# Patient Record
Sex: FEMALE | Race: WHITE | Employment: OTHER | URBAN - METROPOLITAN AREA
[De-identification: names, ages, dates, MRNs, and addresses within clinical notes are randomized per-mention and may not be internally consistent; named-entity substitution may affect disease eponyms.]

---

## 2018-04-06 ENCOUNTER — RECORDS - HEALTHEAST (OUTPATIENT)
Dept: LAB | Facility: CLINIC | Age: 83
End: 2018-04-06

## 2018-04-11 LAB
QTF INTERPRETATION: NORMAL
QTF MITOGEN - NIL: >10 IU/ML
QTF NIL: 0.08 IU/ML
QTF RESULT: NEGATIVE
QTF TB ANTIGEN - NIL: -0.02 IU/ML

## 2020-01-23 ENCOUNTER — RECORDS - HEALTHEAST (OUTPATIENT)
Dept: LAB | Facility: CLINIC | Age: 85
End: 2020-01-23

## 2020-01-23 LAB
ANION GAP SERPL CALCULATED.3IONS-SCNC: 9 MMOL/L (ref 5–18)
BUN SERPL-MCNC: 20 MG/DL (ref 8–28)
CALCIUM SERPL-MCNC: 9 MG/DL (ref 8.5–10.5)
CHLORIDE BLD-SCNC: 94 MMOL/L (ref 98–107)
CO2 SERPL-SCNC: 27 MMOL/L (ref 22–31)
CREAT SERPL-MCNC: 0.77 MG/DL (ref 0.6–1.1)
ERYTHROCYTE [DISTWIDTH] IN BLOOD BY AUTOMATED COUNT: 13 % (ref 11–14.5)
GFR SERPL CREATININE-BSD FRML MDRD: >60 ML/MIN/1.73M2
GLUCOSE BLD-MCNC: 91 MG/DL (ref 70–125)
HCT VFR BLD AUTO: 34.4 % (ref 35–47)
HGB BLD-MCNC: 11.2 G/DL (ref 12–16)
MCH RBC QN AUTO: 32.5 PG (ref 27–34)
MCHC RBC AUTO-ENTMCNC: 32.6 G/DL (ref 32–36)
MCV RBC AUTO: 100 FL (ref 80–100)
PLATELET # BLD AUTO: 492 THOU/UL (ref 140–440)
PMV BLD AUTO: 10 FL (ref 8.5–12.5)
POTASSIUM BLD-SCNC: 4.1 MMOL/L (ref 3.5–5)
RBC # BLD AUTO: 3.45 MILL/UL (ref 3.8–5.4)
SODIUM SERPL-SCNC: 130 MMOL/L (ref 136–145)
WBC: 5.6 THOU/UL (ref 4–11)

## 2020-01-28 ENCOUNTER — RECORDS - HEALTHEAST (OUTPATIENT)
Dept: LAB | Facility: CLINIC | Age: 85
End: 2020-01-28

## 2020-01-29 LAB — SODIUM SERPL-SCNC: 132 MMOL/L (ref 136–145)

## 2020-02-18 ENCOUNTER — RECORDS - HEALTHEAST (OUTPATIENT)
Dept: LAB | Facility: CLINIC | Age: 85
End: 2020-02-18

## 2020-02-19 LAB
25(OH)D3 SERPL-MCNC: 35.6 NG/ML (ref 30–80)
ALBUMIN SERPL-MCNC: 2.8 G/DL (ref 3.5–5)
ALP SERPL-CCNC: 70 U/L (ref 45–120)
ALT SERPL W P-5'-P-CCNC: <9 U/L (ref 0–45)
ANION GAP SERPL CALCULATED.3IONS-SCNC: 8 MMOL/L (ref 5–18)
AST SERPL W P-5'-P-CCNC: 18 U/L (ref 0–40)
BILIRUB SERPL-MCNC: 0.3 MG/DL (ref 0–1)
BUN SERPL-MCNC: 17 MG/DL (ref 8–28)
CALCIUM SERPL-MCNC: 9 MG/DL (ref 8.5–10.5)
CHLORIDE BLD-SCNC: 99 MMOL/L (ref 98–107)
CHOLEST SERPL-MCNC: 146 MG/DL
CO2 SERPL-SCNC: 27 MMOL/L (ref 22–31)
CREAT SERPL-MCNC: 0.71 MG/DL (ref 0.6–1.1)
ERYTHROCYTE [DISTWIDTH] IN BLOOD BY AUTOMATED COUNT: 13.6 % (ref 11–14.5)
FASTING STATUS PATIENT QL REPORTED: NORMAL
GFR SERPL CREATININE-BSD FRML MDRD: >60 ML/MIN/1.73M2
GLUCOSE BLD-MCNC: 87 MG/DL (ref 70–125)
HCT VFR BLD AUTO: 32.3 % (ref 35–47)
HDLC SERPL-MCNC: 55 MG/DL
HGB BLD-MCNC: 10.4 G/DL (ref 12–16)
LDLC SERPL CALC-MCNC: 84 MG/DL
MCH RBC QN AUTO: 32.4 PG (ref 27–34)
MCHC RBC AUTO-ENTMCNC: 32.2 G/DL (ref 32–36)
MCV RBC AUTO: 101 FL (ref 80–100)
PLATELET # BLD AUTO: 409 THOU/UL (ref 140–440)
PMV BLD AUTO: 9.9 FL (ref 8.5–12.5)
POTASSIUM BLD-SCNC: 4.1 MMOL/L (ref 3.5–5)
PROT SERPL-MCNC: 6.3 G/DL (ref 6–8)
RBC # BLD AUTO: 3.21 MILL/UL (ref 3.8–5.4)
SODIUM SERPL-SCNC: 134 MMOL/L (ref 136–145)
TRIGL SERPL-MCNC: 36 MG/DL
TSH SERPL DL<=0.005 MIU/L-ACNC: 2.02 UIU/ML (ref 0.3–5)
WBC: 6 THOU/UL (ref 4–11)

## 2020-03-05 ENCOUNTER — RECORDS - HEALTHEAST (OUTPATIENT)
Dept: LAB | Facility: CLINIC | Age: 85
End: 2020-03-05

## 2020-03-05 LAB
ALBUMIN UR-MCNC: ABNORMAL MG/DL
APPEARANCE UR: ABNORMAL
BACTERIA #/AREA URNS HPF: ABNORMAL HPF
BILIRUB UR QL STRIP: NEGATIVE
CAOX CRY #/AREA URNS HPF: PRESENT /[HPF]
COLOR UR AUTO: YELLOW
GLUCOSE UR STRIP-MCNC: NEGATIVE MG/DL
HGB UR QL STRIP: NEGATIVE
KETONES UR STRIP-MCNC: ABNORMAL MG/DL
LEUKOCYTE ESTERASE UR QL STRIP: ABNORMAL
NITRATE UR QL: NEGATIVE
PH UR STRIP: 5.5 [PH] (ref 4.5–8)
RBC #/AREA URNS AUTO: ABNORMAL HPF
SP GR UR STRIP: 1.03 (ref 1–1.03)
SQUAMOUS #/AREA URNS AUTO: ABNORMAL LPF
TRANS CELLS #/AREA URNS HPF: ABNORMAL LPF
UROBILINOGEN UR STRIP-ACNC: ABNORMAL
WBC #/AREA URNS AUTO: ABNORMAL HPF

## 2020-03-06 ENCOUNTER — RECORDS - HEALTHEAST (OUTPATIENT)
Dept: LAB | Facility: CLINIC | Age: 85
End: 2020-03-06

## 2020-03-06 LAB
ALBUMIN SERPL-MCNC: 2.6 G/DL (ref 3.5–5)
ALP SERPL-CCNC: 67 U/L (ref 45–120)
ALT SERPL W P-5'-P-CCNC: <9 U/L (ref 0–45)
ANION GAP SERPL CALCULATED.3IONS-SCNC: 8 MMOL/L (ref 5–18)
AST SERPL W P-5'-P-CCNC: 19 U/L (ref 0–40)
BACTERIA SPEC CULT: NO GROWTH
BILIRUB SERPL-MCNC: 0.2 MG/DL (ref 0–1)
BUN SERPL-MCNC: 15 MG/DL (ref 8–28)
CALCIUM SERPL-MCNC: 8.4 MG/DL (ref 8.5–10.5)
CHLORIDE BLD-SCNC: 100 MMOL/L (ref 98–107)
CO2 SERPL-SCNC: 24 MMOL/L (ref 22–31)
CREAT SERPL-MCNC: 0.64 MG/DL (ref 0.6–1.1)
ERYTHROCYTE [DISTWIDTH] IN BLOOD BY AUTOMATED COUNT: 13.9 % (ref 11–14.5)
GFR SERPL CREATININE-BSD FRML MDRD: >60 ML/MIN/1.73M2
GLUCOSE BLD-MCNC: 79 MG/DL (ref 70–125)
HCT VFR BLD AUTO: 31.9 % (ref 35–47)
HGB BLD-MCNC: 10.2 G/DL (ref 12–16)
MCH RBC QN AUTO: 31.8 PG (ref 27–34)
MCHC RBC AUTO-ENTMCNC: 32 G/DL (ref 32–36)
MCV RBC AUTO: 99 FL (ref 80–100)
PLATELET # BLD AUTO: 357 THOU/UL (ref 140–440)
PMV BLD AUTO: 10.2 FL (ref 8.5–12.5)
POTASSIUM BLD-SCNC: 3.5 MMOL/L (ref 3.5–5)
PROT SERPL-MCNC: 6.1 G/DL (ref 6–8)
RBC # BLD AUTO: 3.21 MILL/UL (ref 3.8–5.4)
SODIUM SERPL-SCNC: 132 MMOL/L (ref 136–145)
WBC: 4.6 THOU/UL (ref 4–11)

## 2020-05-28 ENCOUNTER — APPOINTMENT (OUTPATIENT)
Dept: GENERAL RADIOLOGY | Facility: CLINIC | Age: 85
DRG: 536 | End: 2020-05-28
Attending: EMERGENCY MEDICINE
Payer: MEDICARE

## 2020-05-28 ENCOUNTER — MEDICAL CORRESPONDENCE (OUTPATIENT)
Dept: HEALTH INFORMATION MANAGEMENT | Facility: CLINIC | Age: 85
End: 2020-05-28

## 2020-05-28 ENCOUNTER — TRANSFERRED RECORDS (OUTPATIENT)
Dept: HEALTH INFORMATION MANAGEMENT | Facility: CLINIC | Age: 85
End: 2020-05-28

## 2020-05-28 ENCOUNTER — APPOINTMENT (OUTPATIENT)
Dept: CT IMAGING | Facility: CLINIC | Age: 85
DRG: 536 | End: 2020-05-28
Attending: EMERGENCY MEDICINE
Payer: MEDICARE

## 2020-05-28 ENCOUNTER — HOSPITAL ENCOUNTER (INPATIENT)
Facility: CLINIC | Age: 85
LOS: 6 days | Discharge: NURSING, REHAB OR LONG-TERM CARE | DRG: 536 | End: 2020-06-04
Attending: EMERGENCY MEDICINE | Admitting: SURGERY
Payer: MEDICARE

## 2020-05-28 DIAGNOSIS — I10 BENIGN ESSENTIAL HYPERTENSION: Primary | ICD-10-CM

## 2020-05-28 DIAGNOSIS — S72.001A CLOSED FRACTURE OF NECK OF RIGHT FEMUR, INITIAL ENCOUNTER (H): ICD-10-CM
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
## 2020-05-28 DIAGNOSIS — Z96.642 PRESENCE OF LEFT ARTIFICIAL HIP JOINT: ICD-10-CM

## 2020-05-28 DIAGNOSIS — S09.90XA INJURY OF HEAD, INITIAL ENCOUNTER: ICD-10-CM

## 2020-05-28 PROCEDURE — 99285 EMERGENCY DEPT VISIT HI MDM: CPT | Mod: 25 | Performed by: EMERGENCY MEDICINE

## 2020-05-28 PROCEDURE — 70450 CT HEAD/BRAIN W/O DYE: CPT

## 2020-05-28 PROCEDURE — 99285 EMERGENCY DEPT VISIT HI MDM: CPT | Mod: Z6 | Performed by: EMERGENCY MEDICINE

## 2020-05-28 PROCEDURE — 73523 X-RAY EXAM HIPS BI 5/> VIEWS: CPT

## 2020-05-28 ASSESSMENT — MIFFLIN-ST. JEOR: SCORE: 921.6

## 2020-05-28 NOTE — LETTER
Transition Communication Hand-off for Care Transitions to Next Level of Care Provider    Name: Bharati Crane  : 1925  MRN #: [id number]  Primary Care Provider: ILANA SAMANIEGO     Primary Clinic: [facility] [address]  [facility] [address]     Reason for Hospitalization:  Presence of left artificial hip joint [Z96.642]  Closed fracture of neck of right femur, initial encounter (H) [S72.001A]  Injury of head, initial encounter [S09.90XA]  Other fall on same level, initial encounter [W18.39XA]  Admit Date/Time: 2020  8:21 PM  Discharge Date: 20  Payor Source: Payor: MEDICARE / Plan: MEDICARE / Product Type: Medicare /          Reason for Communication Hand-off Referral: Other D/c back to LTC    Discharge Plan:  Patient Name:  Bharati Crane     Anticipated Discharge Date:  20    Discharge Disposition:   [facility]:  [address]  RN to RN report: [phone]  Fax: [phone]     Concern for non-adherence with plan of care:   Y/N Unknown  Discharge Needs Assessment:    Follow-up specialty is recommended: Unknown    Follow-up plan:    Future Appointments   Date Time Provider Department Center   2020  3:00 PM Claudia Marcos, PT [facility]   2020  7:00 PM Sabrina Mina, OT [facility]       Any outstanding tests or procedures:        Referrals     Future Labs/Procedures    Occupational Therapy Adult Consult     Comments:    Evaluate and treat as clinically indicated.    Reason: Right hip fracture, not surgically repaired  See activity for mobility restrictions    Physical Therapy Adult Consult     Comments:    Evaluate and treat as clinically indicated.    Reason:  Right hip fracture, not surgically repaired  See activity for mobility restrictions            DORETHA Montenegro, LICSW  7B   [phone] (pager) [phone]  2020              
Patient/Parent(s)

## 2020-05-29 ENCOUNTER — ANESTHESIA EVENT (OUTPATIENT)
Dept: EMERGENCY MEDICINE | Facility: CLINIC | Age: 85
DRG: 536 | End: 2020-05-29
Payer: MEDICARE

## 2020-05-29 ENCOUNTER — ANESTHESIA (OUTPATIENT)
Dept: EMERGENCY MEDICINE | Facility: CLINIC | Age: 85
DRG: 536 | End: 2020-05-29
Payer: MEDICARE

## 2020-05-29 PROBLEM — S72.91XA RIGHT FEMORAL FRACTURE (H): Status: ACTIVE | Noted: 2020-05-29

## 2020-05-29 PROBLEM — S72.91XA FEMUR FRACTURE, RIGHT (H): Status: ACTIVE | Noted: 2020-05-29

## 2020-05-29 LAB
ANION GAP SERPL CALCULATED.3IONS-SCNC: 6 MMOL/L (ref 3–14)
BUN SERPL-MCNC: 14 MG/DL (ref 7–30)
CALCIUM SERPL-MCNC: 8.7 MG/DL (ref 8.5–10.1)
CHLORIDE SERPL-SCNC: 96 MMOL/L (ref 94–109)
CO2 SERPL-SCNC: 26 MMOL/L (ref 20–32)
CREAT SERPL-MCNC: 0.55 MG/DL (ref 0.52–1.04)
CREAT SERPL-MCNC: 0.62 MG/DL (ref 0.52–1.04)
ERYTHROCYTE [DISTWIDTH] IN BLOOD BY AUTOMATED COUNT: 14.5 % (ref 10–15)
GFR SERPL CREATININE-BSD FRML MDRD: 77 ML/MIN/{1.73_M2}
GFR SERPL CREATININE-BSD FRML MDRD: 80 ML/MIN/{1.73_M2}
GLUCOSE SERPL-MCNC: 127 MG/DL (ref 70–99)
HCT VFR BLD AUTO: 34.2 % (ref 35–47)
HGB BLD-MCNC: 11.2 G/DL (ref 11.7–15.7)
MCH RBC QN AUTO: 30.9 PG (ref 26.5–33)
MCHC RBC AUTO-ENTMCNC: 32.7 G/DL (ref 31.5–36.5)
MCV RBC AUTO: 94 FL (ref 78–100)
PLATELET # BLD AUTO: 318 10E9/L (ref 150–450)
PLATELET # BLD AUTO: 322 10E9/L (ref 150–450)
POTASSIUM SERPL-SCNC: 4 MMOL/L (ref 3.4–5.3)
RBC # BLD AUTO: 3.63 10E12/L (ref 3.8–5.2)
SODIUM SERPL-SCNC: 128 MMOL/L (ref 133–144)
WBC # BLD AUTO: 5.9 10E9/L (ref 4–11)

## 2020-05-29 PROCEDURE — 25800030 ZZH RX IP 258 OP 636: Performed by: SURGERY

## 2020-05-29 PROCEDURE — 36415 COLL VENOUS BLD VENIPUNCTURE: CPT | Performed by: NURSE PRACTITIONER

## 2020-05-29 PROCEDURE — 25000132 ZZH RX MED GY IP 250 OP 250 PS 637: Mod: GY | Performed by: SURGERY

## 2020-05-29 PROCEDURE — 82565 ASSAY OF CREATININE: CPT | Performed by: NURSE PRACTITIONER

## 2020-05-29 PROCEDURE — 25000128 H RX IP 250 OP 636: Performed by: EMERGENCY MEDICINE

## 2020-05-29 PROCEDURE — 25000128 H RX IP 250 OP 636: Performed by: SURGERY

## 2020-05-29 PROCEDURE — 85049 AUTOMATED PLATELET COUNT: CPT | Performed by: NURSE PRACTITIONER

## 2020-05-29 PROCEDURE — 82565 ASSAY OF CREATININE: CPT | Performed by: SURGERY

## 2020-05-29 PROCEDURE — 99223 1ST HOSP IP/OBS HIGH 75: CPT | Performed by: INTERNAL MEDICINE

## 2020-05-29 PROCEDURE — 99233 SBSQ HOSP IP/OBS HIGH 50: CPT | Performed by: NURSE PRACTITIONER

## 2020-05-29 PROCEDURE — 85027 COMPLETE CBC AUTOMATED: CPT | Performed by: NURSE PRACTITIONER

## 2020-05-29 PROCEDURE — 25000132 ZZH RX MED GY IP 250 OP 250 PS 637: Mod: GY | Performed by: NURSE PRACTITIONER

## 2020-05-29 PROCEDURE — 80048 BASIC METABOLIC PNL TOTAL CA: CPT | Performed by: NURSE PRACTITIONER

## 2020-05-29 PROCEDURE — 25800030 ZZH RX IP 258 OP 636: Performed by: NURSE PRACTITIONER

## 2020-05-29 PROCEDURE — 12000001 ZZH R&B MED SURG/OB UMMC

## 2020-05-29 RX ORDER — FLUTICASONE PROPIONATE 50 MCG
2 SPRAY, SUSPENSION (ML) NASAL AT BEDTIME
COMMUNITY

## 2020-05-29 RX ORDER — OXYCODONE HYDROCHLORIDE 5 MG/1
5 TABLET ORAL
Status: DISCONTINUED | OUTPATIENT
Start: 2020-05-29 | End: 2020-06-04 | Stop reason: HOSPADM

## 2020-05-29 RX ORDER — SENNOSIDES A AND B 8.6 MG/1
1 TABLET, FILM COATED ORAL 2 TIMES DAILY PRN
COMMUNITY

## 2020-05-29 RX ORDER — HYDROMORPHONE HCL/0.9% NACL/PF 0.2MG/0.2
0.2 SYRINGE (ML) INTRAVENOUS ONCE
Status: COMPLETED | OUTPATIENT
Start: 2020-05-29 | End: 2020-05-29

## 2020-05-29 RX ORDER — VITAMIN B COMPLEX
2000 TABLET ORAL DAILY
Status: DISCONTINUED | OUTPATIENT
Start: 2020-05-29 | End: 2020-06-04 | Stop reason: HOSPADM

## 2020-05-29 RX ORDER — LOSARTAN POTASSIUM 50 MG/1
50 TABLET ORAL DAILY
Status: DISCONTINUED | OUTPATIENT
Start: 2020-05-29 | End: 2020-06-04 | Stop reason: HOSPADM

## 2020-05-29 RX ORDER — ROPINIROLE 0.25 MG/1
0.25 TABLET, FILM COATED ORAL 3 TIMES DAILY
COMMUNITY

## 2020-05-29 RX ORDER — OXYCODONE HYDROCHLORIDE 5 MG/1
5 TABLET ORAL
Status: DISCONTINUED | OUTPATIENT
Start: 2020-05-29 | End: 2020-05-29

## 2020-05-29 RX ORDER — LOSARTAN POTASSIUM 50 MG/1
50 TABLET ORAL DAILY
COMMUNITY

## 2020-05-29 RX ORDER — HYDROMORPHONE HCL/0.9% NACL/PF 0.2MG/0.2
0.2 SYRINGE (ML) INTRAVENOUS
Status: DISCONTINUED | OUTPATIENT
Start: 2020-05-29 | End: 2020-06-03

## 2020-05-29 RX ORDER — ROPINIROLE 1 MG/1
1 TABLET, FILM COATED ORAL AT BEDTIME
COMMUNITY

## 2020-05-29 RX ORDER — ONDANSETRON 4 MG/1
4 TABLET, ORALLY DISINTEGRATING ORAL EVERY 6 HOURS PRN
Status: DISCONTINUED | OUTPATIENT
Start: 2020-05-29 | End: 2020-06-04 | Stop reason: HOSPADM

## 2020-05-29 RX ORDER — SODIUM CHLORIDE 9 MG/ML
INJECTION, SOLUTION INTRAVENOUS CONTINUOUS
Status: DISCONTINUED | OUTPATIENT
Start: 2020-05-29 | End: 2020-06-01

## 2020-05-29 RX ORDER — MULTIVITAMIN,THERAPEUTIC
1 TABLET ORAL DAILY
Status: DISCONTINUED | OUTPATIENT
Start: 2020-05-29 | End: 2020-06-04 | Stop reason: HOSPADM

## 2020-05-29 RX ORDER — HYDROMORPHONE HYDROCHLORIDE 1 MG/ML
0.3 INJECTION, SOLUTION INTRAMUSCULAR; INTRAVENOUS; SUBCUTANEOUS
Status: COMPLETED | OUTPATIENT
Start: 2020-05-29 | End: 2020-05-29

## 2020-05-29 RX ORDER — HYDROMORPHONE HYDROCHLORIDE 1 MG/ML
0.3 INJECTION, SOLUTION INTRAMUSCULAR; INTRAVENOUS; SUBCUTANEOUS ONCE
Status: COMPLETED | OUTPATIENT
Start: 2020-05-29 | End: 2020-05-29

## 2020-05-29 RX ORDER — AMOXICILLIN 250 MG
1-2 CAPSULE ORAL 2 TIMES DAILY
Status: DISCONTINUED | OUTPATIENT
Start: 2020-05-29 | End: 2020-05-29

## 2020-05-29 RX ORDER — LIDOCAINE 40 MG/G
CREAM TOPICAL
Status: DISCONTINUED | OUTPATIENT
Start: 2020-05-29 | End: 2020-06-04 | Stop reason: HOSPADM

## 2020-05-29 RX ORDER — ACETAMINOPHEN 500 MG
1000 TABLET ORAL 2 TIMES DAILY
Status: ON HOLD | COMMUNITY
End: 2020-06-04

## 2020-05-29 RX ORDER — POLYETHYLENE GLYCOL 3350 17 G/17G
17 POWDER, FOR SOLUTION ORAL DAILY PRN
Status: DISCONTINUED | OUTPATIENT
Start: 2020-05-29 | End: 2020-05-29

## 2020-05-29 RX ORDER — METHOCARBAMOL 750 MG/1
750 TABLET, FILM COATED ORAL 3 TIMES DAILY
Status: DISCONTINUED | OUTPATIENT
Start: 2020-05-29 | End: 2020-06-04 | Stop reason: HOSPADM

## 2020-05-29 RX ORDER — ACETAMINOPHEN 325 MG/1
975 TABLET ORAL 3 TIMES DAILY
Status: DISCONTINUED | OUTPATIENT
Start: 2020-05-29 | End: 2020-06-04 | Stop reason: HOSPADM

## 2020-05-29 RX ORDER — MULTIVITAMIN,THERAPEUTIC
1 TABLET ORAL DAILY
COMMUNITY

## 2020-05-29 RX ORDER — MINERAL OIL AND PETROLATUM 150; 830 MG/G; MG/G
OINTMENT OPHTHALMIC AT BEDTIME
COMMUNITY

## 2020-05-29 RX ORDER — ONDANSETRON 2 MG/ML
4 INJECTION INTRAMUSCULAR; INTRAVENOUS EVERY 6 HOURS PRN
Status: DISCONTINUED | OUTPATIENT
Start: 2020-05-29 | End: 2020-06-04 | Stop reason: HOSPADM

## 2020-05-29 RX ORDER — AMLODIPINE BESYLATE 5 MG/1
5 TABLET ORAL DAILY
Status: DISCONTINUED | OUTPATIENT
Start: 2020-05-29 | End: 2020-06-02

## 2020-05-29 RX ORDER — CALCIUM CARBONATE 500(1250)
1 TABLET ORAL 2 TIMES DAILY
COMMUNITY

## 2020-05-29 RX ORDER — LANOLIN ALCOHOL/MO/W.PET/CERES
6 CREAM (GRAM) TOPICAL AT BEDTIME
COMMUNITY

## 2020-05-29 RX ORDER — POLYETHYLENE GLYCOL 3350 17 G/17G
17 POWDER, FOR SOLUTION ORAL DAILY PRN
Status: DISCONTINUED | OUTPATIENT
Start: 2020-05-29 | End: 2020-05-30

## 2020-05-29 RX ORDER — LEVOTHYROXINE SODIUM 75 UG/1
75 TABLET ORAL EVERY EVENING
COMMUNITY

## 2020-05-29 RX ORDER — NALOXONE HYDROCHLORIDE 0.4 MG/ML
.1-.4 INJECTION, SOLUTION INTRAMUSCULAR; INTRAVENOUS; SUBCUTANEOUS
Status: DISCONTINUED | OUTPATIENT
Start: 2020-05-29 | End: 2020-05-31

## 2020-05-29 RX ORDER — AMOXICILLIN 250 MG
1-2 CAPSULE ORAL 2 TIMES DAILY
Status: DISCONTINUED | OUTPATIENT
Start: 2020-05-29 | End: 2020-05-30

## 2020-05-29 RX ORDER — OXYCODONE HYDROCHLORIDE 5 MG/1
5 TABLET ORAL EVERY 4 HOURS PRN
Status: ON HOLD | COMMUNITY
End: 2020-06-04

## 2020-05-29 RX ORDER — CHOLECALCIFEROL (VITAMIN D3) 50 MCG
1 TABLET ORAL DAILY
COMMUNITY

## 2020-05-29 RX ORDER — AMLODIPINE BESYLATE 5 MG/1
5 TABLET ORAL DAILY
Status: ON HOLD | COMMUNITY
End: 2020-06-04

## 2020-05-29 RX ORDER — ACETAMINOPHEN 325 MG/1
975 TABLET ORAL 3 TIMES DAILY
Status: DISCONTINUED | OUTPATIENT
Start: 2020-05-29 | End: 2020-05-29

## 2020-05-29 RX ORDER — POLYETHYLENE GLYCOL 3350 17 G/17G
1 POWDER, FOR SOLUTION ORAL DAILY
Status: ON HOLD | COMMUNITY
End: 2020-06-04

## 2020-05-29 RX ORDER — NALOXONE HYDROCHLORIDE 0.4 MG/ML
.1-.4 INJECTION, SOLUTION INTRAMUSCULAR; INTRAVENOUS; SUBCUTANEOUS
Status: DISCONTINUED | OUTPATIENT
Start: 2020-05-29 | End: 2020-05-29

## 2020-05-29 RX ORDER — ROPINIROLE 1 MG/1
1 TABLET, FILM COATED ORAL AT BEDTIME
Status: DISCONTINUED | OUTPATIENT
Start: 2020-05-29 | End: 2020-06-04 | Stop reason: HOSPADM

## 2020-05-29 RX ORDER — BISACODYL 10 MG
10 SUPPOSITORY, RECTAL RECTAL DAILY PRN
Status: DISCONTINUED | OUTPATIENT
Start: 2020-05-29 | End: 2020-06-04 | Stop reason: HOSPADM

## 2020-05-29 RX ORDER — HYDROMORPHONE HCL/0.9% NACL/PF 0.2MG/0.2
0.2 SYRINGE (ML) INTRAVENOUS
Status: DISCONTINUED | OUTPATIENT
Start: 2020-05-29 | End: 2020-05-29

## 2020-05-29 RX ORDER — CALCIUM CARBONATE 500(1250)
1 TABLET ORAL 2 TIMES DAILY
Status: DISCONTINUED | OUTPATIENT
Start: 2020-05-29 | End: 2020-06-04 | Stop reason: HOSPADM

## 2020-05-29 RX ADMIN — AMLODIPINE BESYLATE 5 MG: 5 TABLET ORAL at 14:20

## 2020-05-29 RX ADMIN — ROPINIROLE HYDROCHLORIDE 1 MG: 1 TABLET, FILM COATED ORAL at 22:20

## 2020-05-29 RX ADMIN — HYDROMORPHONE HYDROCHLORIDE 0.3 MG: 1 INJECTION, SOLUTION INTRAMUSCULAR; INTRAVENOUS; SUBCUTANEOUS at 04:21

## 2020-05-29 RX ADMIN — ACETAMINOPHEN 975 MG: 325 TABLET, FILM COATED ORAL at 14:19

## 2020-05-29 RX ADMIN — LOSARTAN POTASSIUM 50 MG: 50 TABLET, FILM COATED ORAL at 21:09

## 2020-05-29 RX ADMIN — CALCIUM 500 MG: 500 TABLET ORAL at 14:21

## 2020-05-29 RX ADMIN — Medication 0.2 MG: at 09:23

## 2020-05-29 RX ADMIN — DEXTROSE AND SODIUM CHLORIDE 1000 ML: 5; 900 INJECTION, SOLUTION INTRAVENOUS at 06:57

## 2020-05-29 RX ADMIN — OXYCODONE HYDROCHLORIDE 5 MG: 5 TABLET ORAL at 08:04

## 2020-05-29 RX ADMIN — MELATONIN 2000 UNITS: at 14:21

## 2020-05-29 RX ADMIN — Medication 0.2 MG: at 14:19

## 2020-05-29 RX ADMIN — METHOCARBAMOL 750 MG: 750 TABLET, FILM COATED ORAL at 21:09

## 2020-05-29 RX ADMIN — SENNOSIDES, DOCUSATE SODIUM 1 TABLET: 8.6; 5 TABLET ORAL at 21:09

## 2020-05-29 RX ADMIN — LEVOTHYROXINE SODIUM 75 MCG: 0.07 TABLET ORAL at 21:09

## 2020-05-29 RX ADMIN — ENOXAPARIN SODIUM 30 MG: 30 INJECTION SUBCUTANEOUS at 12:23

## 2020-05-29 RX ADMIN — HYDROMORPHONE HYDROCHLORIDE 0.3 MG: 1 INJECTION, SOLUTION INTRAMUSCULAR; INTRAVENOUS; SUBCUTANEOUS at 01:08

## 2020-05-29 RX ADMIN — OXYCODONE HYDROCHLORIDE 5 MG: 5 TABLET ORAL at 22:20

## 2020-05-29 RX ADMIN — THERA TABS 1 TABLET: TAB at 14:20

## 2020-05-29 RX ADMIN — Medication 0.2 MG: at 21:19

## 2020-05-29 RX ADMIN — ACETAMINOPHEN 975 MG: 325 TABLET, FILM COATED ORAL at 21:09

## 2020-05-29 RX ADMIN — SODIUM CHLORIDE: 9 INJECTION, SOLUTION INTRAVENOUS at 12:43

## 2020-05-29 RX ADMIN — Medication 0.2 MG: at 06:57

## 2020-05-29 RX ADMIN — CALCIUM 500 MG: 500 TABLET ORAL at 21:09

## 2020-05-29 ASSESSMENT — ACTIVITIES OF DAILY LIVING (ADL)
ADLS_ACUITY_SCORE: 25
ADLS_ACUITY_SCORE: 29

## 2020-05-29 NOTE — H&P
[facility] [place]    History and Physical: Trauma Service     Date of Admission:  5/28/2020    Time of Consult Request (page/call): 10:50    Time of my evaluation: 11:00  Consulting services:  Orthopedics - Non-emergent consult: Called by ED    Assessment & Plan   Trauma mechanism: Fall  Time/date of injury: Earlier this evening  Known Injuries:  1. Right femoral neck fracture  Other diagnoses:   1. History of left Girdlestone procedure  2. Dementia  3. DONALD  4. Osteoporosis    Plan:  1. Admit to trauma service, general care bed  2. Orthopedic surgery consulted, anticipate non-operative management  3. Pain control with prn oral medication  4. SW for discharge planning    Code status: DNR     ETOH: This patient was asked if in the last 3-6 months there has been a time when she had 4 or more drinks in a single day/outing.. Patient answer to the screening question was in the negative. No intervention needed.  Primary Care Physician   No primary care provider on file.    Chief Complaint   Hip pain    History is obtained from the patient and medical record    History of Present Illness   Bharati Crane is a 89 year old female who presented to our ED via EMS with hip pain. She fell today while transferring and landed on her right hip. She is not ambulatory due to an extensive history involving her left hip which ended with a Girdlestone procedure. Currently pain is in her hip and back only. She denies any other complaints.    Past Medical History    Hearing loss  Dementia  Osteoporosis  Hypothyroidism  Hypertension    Past Surgical History   Multiple orthopedic surgeries to left hip    Medication history  Ropinirole  Amlodipine  Losartan  Levothyroxine  ASA 81    Allergies   No Known Allergies    Social History   No EtOH or illicit drugs    Family History   Family history reviewed with patient and is noncontributory.  Stroke in Mother and Sister  Brother with Lung Cancer    Review of Systems    Complete 12 point ROS negative other than noted above    Physical Exam   Temp: 98.6  F (37  C) Temp src: Oral BP: (!) 147/54 Pulse: 86 Heart Rate: 97 Resp: 20 SpO2: 92 %      Vital Signs with Ranges  Temp:  [98.6  F (37  C)] 98.6  F (37  C)  Pulse:  [86-97] 86  Heart Rate:  [97] 97  Resp:  [20] 20  BP: (147-177)/(54-75) 147/54  SpO2:  [92 %-96 %] 92 % 133 lbs 6.4 oz    Primary Survey:  Airway: patient talking  Breathing: symmetric respiratory effort bilaterally  Circulation: peripheral pulses present  [place] Coma Scale - Total 15/15  Eye Response (E): 4   4= spontaneous,  3= to verbal/voice, 2=  to pain, 1= No response   Verbal Response (V): 5   5= Orientated, converses,  4= Confused, converses, 3= Inappropriate words,  2= Incomprehensible sounds,  1=No response   Motor Response (M): 6   6= Obeys commands, 5= Localizes to pain, 4= Withdrawal to pain, 3=Fexion to pain, 2= Extension to pain, 1= No response    Secondary Survey:  General: alert, oriented to person, place, time  Head: atraumatic, normocephalic  Eyes: PERRLA, EOMI, corneas and conjunctivae clear  Nose: nares patent, no drainage  Mouth/Throat: no dental tenderness or malocclusions, no tongue lacerations  Neck: No midline posterior tenderness, full ROM without pain  Chest/Pulmonary: normal respiratory rate and rhythm, no wheezes, no chest wall tenderness or deformities,   Cardiovascular: normal and regular rate and rhythm  Abdomen: soft, non-tender, no guarding  Back/Spine: no deformity, no midline tenderness  Musculoskel/Extremities: normal extremities, major joints without tenderness, edema  Hand: no gross deformities of hands or fingers. Full AROM of hand and fingers in flexion and extension.  strength equal and symmetric.   Skin: no rashes, laceration, ecchymosis, skin warm and dry.   Psychiatric: affect/mood normal, cooperative    Data     Results for orders placed or performed during the hospital encounter of 05/28/20 (from the past 24 hour(s))    CT Head w/o Contrast    Narrative    CT HEAD W/O CONTRAST 5/28/2020 9:52 PM    Provided History: Fall, struck head  ICD-10:    Comparison: None available.    Technique: Using multidetector thin collimation helical acquisition  technique, axial, coronal and sagittal CT images from the skull base  to the vertex were obtained without intravenous contrast.     Findings:  Images are moderately degraded secondary to patient motion.  No intracranial hemorrhage, mass effect, or midline shift. The  ventricles are proportionate to the cerebral sulci. Periventricular  and subcortical white matter hypoattenuation is nonspecific but likely  represent chronic small vessel ischemic disease in a patient this age.  Moderate generalized cerebral atrophy. The gray to white matter  differentiation of the cerebral hemispheres is preserved. The basal  cisterns are patent.    The visualized paranasal sinuses are clear. The mastoid air cells are  clear.       Impression    Impression: Images are moderately degraded secondary to patient  motion.  1. No acute intracranial pathology.  2. Senescent changes including moderate Leukoaraiosis and moderate  generalized cerebral atrophy.    BIBI GONZALEZ MD   XR Pelvis and Hip Bilateral 2 Views    Narrative    EXAM: XR PELVIS AND HIP BILATERAL 2 VIEWS  LOCATION: [facility]  DATE/TIME: 5/28/2020 9:49 PM    INDICATION: Pain after fall. History of left hip fracture and dislocation.  COMPARISON: None.      Impression    IMPRESSION: There is a displaced right femoral neck fracture. 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 Deformity of the right pubic rami appears to be old.       Studies:  XR Pelvis and Hip Bilateral 2 Views   Final Result   IMPRESSION: There is a displaced right femoral neck fracture. 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 Deformity of the right pubic rami appears to  be old.      CT Head w/o Contrast   Final Result   Impression: Images are moderately degraded secondary to patient   motion.   1. No acute intracranial pathology.   2. Senescent changes including moderate Leukoaraiosis and moderate   generalized cerebral atrophy.      MD Salomon REESE

## 2020-05-29 NOTE — ED NOTES
[facility], [place]   ED Nurse to Floor Handoff     Bharati Crane is a 89 year old female who speaks English and lives unknown,  in an assisted living  They arrived in the ED by ambulance from home    ED Chief Complaint: Hip Pain    ED Dx;   Final diagnoses:   Closed fracture of neck of right femur, initial encounter (H)         Needed?: No    Allergies: No Known Allergies.  Past Medical Hx: No past medical history on file.   Baseline Mental status: mild dementia  Current Mental Status changes: at basesline - oriented to self and place    Infection present or suspected this encounter: no  Sepsis suspected: No  Isolation type: No active isolations     Activity level - Baseline/Home: non-ambulatory per H&P  Activity Level - Current:   has not been out of bed in ED    Bariatric equipment needed?: No    In the ED these meds were given:   Medications   naloxone (NARCAN) injection 0.1-0.4 mg (has no administration in time range)   lidocaine 1 % 1 mL (has no administration in time range)   lidocaine (LMX4) cream (has no administration in time range)   sodium chloride (PF) 0.9% PF flush 3 mL (has no administration in time range)   sodium chloride (PF) 0.9% PF flush 3 mL (has no administration in time range)   enoxaparin ANTICOAGULANT (LOVENOX) injection 30 mg (has no administration in time range)   dextrose 5% and 0.9% NaCl infusion ( Intravenous Rate/Dose Verify 5/29/20 0939)   acetaminophen (TYLENOL) tablet 975 mg (has no administration in time range)   HYDROmorphone (DILAUDID) injection 0.2 mg (0.2 mg Intravenous Given 5/29/20 0923)   oxyCODONE (ROXICODONE) tablet 5 mg (5 mg Oral Given 5/29/20 0804)   ondansetron (ZOFRAN-ODT) ODT tab 4 mg (has no administration in time range)     Or   ondansetron (ZOFRAN) injection 4 mg (has no administration in time range)   bisacodyl (DULCOLAX) Suppository 10 mg (has no administration in time range)   senna-docusate (SENOKOT-S/PERICOLACE) 8.6-50  MG per tablet 1-2 tablet (has no administration in time range)   polyethylene glycol (MIRALAX) Packet 17 g (has no administration in time range)   HYDROmorphone (PF) (DILAUDID) injection 0.3 mg (0.3 mg Intravenous Given 5/29/20 0108)   HYDROmorphone (PF) (DILAUDID) injection 0.3 mg (0.3 mg Intravenous Given 5/29/20 0421)   HYDROmorphone (DILAUDID) injection 0.2 mg (0.2 mg Intravenous Given 5/29/20 0657)       Drips running?  Yes    Home pump  No    Current LDAs  Peripheral IV 05/28/20 Right Upper forearm (Active)   Number of days: 1       Peripheral IV 05/29/20 Left Hand (Active)   Site Assessment WDL 05/29/20 0992   Number of days: 0       Labs results:   Labs Ordered and Resulted from Time of ED Arrival Up to the Time of Departure from the ED   CBC WITH PLATELETS - Abnormal; Notable for the following components:       Result Value    RBC Count 3.63 (*)     Hemoglobin 11.2 (*)     Hematocrit 34.2 (*)     All other components within normal limits   BASIC METABOLIC PANEL - Abnormal; Notable for the following components:    Sodium 128 (*)     Glucose 127 (*)     All other components within normal limits   ROUTINE UA WITH MICROSCOPIC REFLEX TO CULTURE   IP ASSIGN PROVIDER TEAM TO TREATMENT TEAM   VITAL SIGNS   ACTIVITY   PULSE OXIMETRY NURSING   PAIN ASSESSMENT   PERIPHERAL IV CATHETER       Imaging Studies:   Recent Results (from the past 24 hour(s))   CT Head w/o Contrast    Narrative    CT HEAD W/O CONTRAST 5/28/2020 9:52 PM    Provided History: Fall, struck head  ICD-10:    Comparison: None available.    Technique: Using multidetector thin collimation helical acquisition  technique, axial, coronal and sagittal CT images from the skull base  to the vertex were obtained without intravenous contrast.     Findings:  Images are moderately degraded secondary to patient motion.  No intracranial hemorrhage, mass effect, or midline shift. The  ventricles are proportionate to the cerebral sulci. Periventricular  and  subcortical white matter hypoattenuation is nonspecific but likely  represent chronic small vessel ischemic disease in a patient this age.  Moderate generalized cerebral atrophy. The gray to white matter  differentiation of the cerebral hemispheres is preserved. The basal  cisterns are patent.    The visualized paranasal sinuses are clear. The mastoid air cells are  clear.       Impression    Impression: Images are moderately degraded secondary to patient  motion.  1. No acute intracranial pathology.  2. Senescent changes including moderate Leukoaraiosis and moderate  generalized cerebral atrophy.    BIBI GONZALEZ MD   XR Pelvis and Hip Bilateral 2 Views    Narrative    EXAM: XR PELVIS AND HIP BILATERAL 2 VIEWS  LOCATION: [facility]  DATE/TIME: 5/28/2020 9:49 PM    INDICATION: Pain after fall. History of left hip fracture and dislocation.  COMPARISON: None.      Impression    IMPRESSION: There is a displaced right femoral neck fracture. 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 Deformity of the right pubic rami appears to be old.       Recent vital signs:   BP (!) 144/55   Pulse 85   Temp 98.6  F (37  C) (Oral)   Resp 20   Ht 1.524 m (5')   Wt 60.5 kg (133 lb 6.4 oz)   SpO2 96%   BMI 26.05 kg/m      [place] Coma Scale Score: 14 (05/29/20 0725)       Cardiac Rhythm: N/A  Pt needs tele? No  Skin/wound Issues: None    Code Status: DNR    Pain control: good after IV dilaudid and oxycodone - patient is now sleeping.    Nausea control: pt had none    Abnormal labs/tests/findings requiring intervention: See Epic    Family present during ED course? No   Family Comments/Social Situation comments: Patient is pleasant and cooperative.     Tasks needing completion: UA still needed    Janel Rueda, RN      [phone] [facility]

## 2020-05-29 NOTE — ED PROVIDER NOTES
[place] EMERGENCY DEPARTMENT ([facility])  May 28, 2020  History     Chief Complaint   Patient presents with     Hip Pain     The history is provided by the patient and medical records.     Bharati Crane is a 89 year old female with a history of dementia, DONALD on CPAP, osteoporosis, and HTN who presents to the Emergency Department via EMS with right hip pain.     Per chart review, [place] [facility] Patient had left femoral neck fracture on 1/8/2020 s/p ORIF 1/9/2020 s/p left hemiarthroplasty on 3/7/2020 2/2 pin failure. Patient then had a mechanical fall leading to dislocation and closed reduction with conscious sedation in ED x2 on 3/16/2020 and 3/19/2020 s/p left hip explant hemiarthoplasty with Girdlestone procedure on 3/20/2020. Patient had another fall recently on 5/23/2020 reporting left hip to knee pain.     Patient reports she fell again today while tranferring. She reports after the fall she had increased right hip pain. Patient states she also hit her head, but there was no issue with this. Patient denies other pain. Patient had an x-ray done at her facility today with result of acute fracture of right femoral neck.    No other symptoms noted.    PAST MEDICAL HISTORY: No past medical history on file.    PAST SURGICAL HISTORY: No past surgical history on file.    Past medical history, past surgical history, medications, and allergies were reviewed with the patient. Additional pertinent items: history of dementia, DONALD on CPAP, osteoporosis, and HTN via Care Everywhere    FAMILY HISTORY: No family history on file.    SOCIAL HISTORY:   Social History     Tobacco Use     Smoking status: Not on file   Substance Use Topics     Alcohol use: Not on file     Social history was reviewed with the patient. Additional pertinent items: None      Patient's Medications    No medications on file        No Known Allergies     Review of Systems    ROS: 10 point ROS neg other than the  symptoms noted above in the HPI.    A complete review of systems was performed with pertinent positives and negatives noted in the HPI, and all other systems negative.    Physical Exam   BP: (!) 165/63  Heart Rate: 97  Temp: 98.6  F (37  C)  Resp: 20  Height: 152.4 cm (5')  Weight: 60.5 kg (133 lb 6.4 oz)  SpO2: 94 %      Physical Exam  Constitutional:       General: She is not in acute distress.     Appearance: She is well-developed. She is not diaphoretic.   HENT:      Head: Normocephalic and atraumatic.      Mouth/Throat:      Pharynx: No oropharyngeal exudate.   Eyes:      General: No scleral icterus.        Right eye: No discharge.         Left eye: No discharge.      Pupils: Pupils are equal, round, and reactive to light.   Neck:      Musculoskeletal: Normal range of motion and neck supple.   Cardiovascular:      Rate and Rhythm: Normal rate and regular rhythm.      Heart sounds: Normal heart sounds. No murmur. No friction rub. No gallop.    Pulmonary:      Effort: Pulmonary effort is normal. No respiratory distress.      Breath sounds: Normal breath sounds. No wheezing.   Chest:      Chest wall: No tenderness.   Abdominal:      General: Bowel sounds are normal. There is no distension.      Palpations: Abdomen is soft.      Tenderness: There is no abdominal tenderness.   Musculoskeletal: Normal range of motion.         General: No tenderness or deformity.      Comments: Right hip tenderness.  Shortened left leg.   Skin:     General: Skin is warm and dry.      Coloration: Skin is not pale.      Findings: No erythema or rash.   Neurological:      Mental Status: She is alert and oriented to person, place, and time. Mental status is at baseline.      Cranial Nerves: No cranial nerve deficit.         ED Course   9:23 PM  The patient was seen and examined by Anthony Macedo DO in Room ED16.      Procedures                           No results found for this or any previous visit (from the past 24  hour(s)).  Medications - No data to display          Assessments & Plan (with Medical Decision Making)   This is a 89-year-old female previous left hip fracture and Girdlestone procedure who presents with a right hip fracture.  This occurred while patient was transferring today.  She has right hip pain.  She also states she struck her head but this was minor.  X-ray shows right displaced femoral neck fracture.  Left side shows postoperative changes.  Head CT shows no acute abnormalities.  I discussed the case with trauma as well as orthopedic surgery who saw the patient.  Patient will be admitted to the trauma service.    I have reviewed the nursing notes.    I have reviewed the findings, diagnosis, plan and need for follow up with the patient.    New Prescriptions    No medications on file       Final diagnoses:   None     ICary, am serving as a trained medical scribe to document services personally performed by Anthony Macedo DO, based on the provider's statements to me.      Anthony RINCON DO, was physically present and have reviewed and verified the accuracy of this note documented by Cary Chu.    5/28/2020   [facility], [place], EMERGENCY DEPARTMENT     Anthony Macedo DO  05/29/20 0342

## 2020-05-29 NOTE — PHARMACY-ADMISSION MEDICATION HISTORY
Admission medication history interview status for the 5/28/2020 admission is complete. See Epic admission navigator for allergy information, pharmacy, prior to admission medications and immunization status.     Medication history interview sources:  [facility]    Out[facility]: [facility]    Changes made to PTA medication list (reason)  Added: entire medication list  Deleted: N/A  Changed: N/A    Additional medication history information (including reliability of information, actions taken by pharmacist):  - Reliability: excellent. Medication list completed per nursing home MAR.     [place] :  04/23/2020 Oxycodone Hcl 5 Mg Tablet #28 5 day supply    Prior to Admission medications    Medication Sig Last Dose Taking? Auth Provider   acetaminophen (TYLENOL) 500 MG tablet Take 1,000 mg by mouth 2 times daily 5/28/2020 at 1600 Yes Unknown, Entered By History   amLODIPine (NORVASC) 5 MG tablet Take 5 mg by mouth daily 5/28/2020 at 1600 Yes Unknown, Entered By History   calcium carbonate 500 mg, elemental, (OSCAL) 500 MG tablet Take 1 tablet by mouth 2 times daily 5/28/2020 at 0800 Yes Unknown, Entered By History   fluticasone (FLONASE) 50 MCG/ACT nasal spray Spray 2 sprays into both nostrils At Bedtime 5/27/2020 at 1600 Yes Unknown, Entered By History   levothyroxine (SYNTHROID/LEVOTHROID) 75 MCG tablet Take 75 mcg by mouth every evening 5/27/2020 at 1600 Yes Unknown, Entered By History   losartan (COZAAR) 50 MG tablet Take 50 mg by mouth daily 5/28/2020 at 1600 Yes Unknown, Entered By History   melatonin 3 MG tablet Take 6 mg by mouth At Bedtime 5/27/2020 at 2000 Yes Unknown, Entered By History   multivitamin, therapeutic (THERA-VIT) TABS tablet Take 1 tablet by mouth daily 5/28/2020 at 0800 Yes Unknown, Entered By History   oxyCODONE (ROXICODONE) 5 MG tablet Take 5 mg by mouth every 4 hours as needed for severe pain 5/28/2020 at 1400 Yes Unknown, Entered By History    polyethylene glycol (MIRALAX) 17 g packet Take 1 packet by mouth daily 5/28/2020 at 0800 Yes Unknown, Entered By History   rOPINIRole (REQUIP) 0.25 MG tablet Take 0.25 mg by mouth 3 times daily 5/28/2020 at 1200 Yes Unknown, Entered By History   rOPINIRole (REQUIP) 1 MG tablet Take 1 mg by mouth At Bedtime 5/27/2020 at 2000 Yes Unknown, Entered By History   senna (SENOKOT) 8.6 MG tablet Take 1 tablet by mouth 2 times daily as needed for constipation 5/16/2020 at 1600 Yes Unknown, Entered By History   vitamin D3 (CHOLECALCIFEROL) 2000 units (50 mcg) tablet Take 1 tablet by mouth daily 5/28/2020 at 0800 Yes Unknown, Entered By History   White Petrolatum-Mineral Oil (ARTIFICIAL TEARS) 83-15 % OINT Place into both eyes At Bedtime 5/27/2020 at 2000 Yes Unknown, Entered By History         Medication history completed by:   Michelle Chang, PharmD  PGY1 Pharmacy Practice Resident in Behavioral Health

## 2020-05-29 NOTE — CONSULTS
Orthopaedic Surgery Consultation    Bharati Crane MRN# [id number]   Age: 89 year old YOB: 1925   Date of Admission:  5/28/2020    Reason for consult:  Right femoral neck fracture   Requesting physician: Anthony Macedo DO            Impression and Recommendation (Resident / Clinician):   Impression:  Bharati Crane is a 89 year old female with a significant PMH of dementia, obstructive sleep apnea, osteoporosis, hypertension, previous left femoral neck fracture status post Girdlestone, now nonambulatory who present a right femoral neck fracture after a fall during a transfer     Recomendations:  -Likely plan for nonoperative management given nonambulatory status at baseline and contralateral Girdlestone procedure  -Admit to the trauma service  -PO and IV pain control, consider indwelling pain catheter  -Okay for diet  -Okay for DVT prophylaxis per the trauma team      Jas Reyes MD  Orthopaedic Surgery, PGY-4  Pager: [phone]      Please page me directly with any questions/concerns during regular weekday hours before 5pm. If there is no response, if it is a weekend, or if it is during evening hours then please page the orthopaedic surgery resident on call.    Attestation:  This patient will be discussed with Dr Aaron in the morning.         Chief Complaint:   Right hip pain          History of Present Illness (Resident / Clinician):   Bharati Crane is a 89 year old female with a significant PMH of dementia, obstructive sleep apnea, osteoporosis, hypertension, previous left femoral neck fracture status post Girdlestone, now nonambulatory who present a right femoral neck fracture after a fall during a transfer     History obtained from patient interview and chart review.        Past Medical History:   No past medical history on file.  Reviewed         Past Surgical History:   No past surgical history on file.   Reviewed with patient       Social History:   Tobacco use: Non-smoker  Alcohol  use: None  Ambulation: Nonambulatory  Living situation: Assisted living facility          Family History:   No family history of anesthesia, bleeding or clotting complications.           Allergies:   No Known Allergies          Medications:   Medication reviewed with patient and in chart.  Anticoagulation: None  Antibiotics: None          Review of Systems:   A 12 point ROS was conducted and was otherwise negative except for HPI above.          Physical Exam:   BP (!) 147/61   Pulse 92   Temp 98.6  F (37  C) (Oral)   Resp 20   Ht 1.524 m (5')   Wt 60.5 kg (133 lb 6.4 oz)   SpO2 94%   BMI 26.05 kg/m    General: Awake, confused at baseline, cooperative, appears in pain, appears stated age  HEENT: Normocephalic, atraumatic, EOMI, no scleral icterus  Respiratory: Breathing non-labored, no wheezing  Cardiovascular: Nontachycardic  Skin: No rashes or lesions  Neurological: A&Ox3, CN II-XII grossly intact    Musculoskeletal:  RLE: No gross deformity. Skin intact. ROM deferred.  Nontender to palpation of the knee, lower leg, ankle, and foot.  Fires TA/Gastroc/EHL/FHL. SILT in femoral, sural, saphenous, deep peroneal, superficial peroneal, and tibial nerve distributions. Dorsalis pedis and posterior tibial arteries 2+ and foot wwp with BCR.          Imaging:   Review of right hip and pelvis x-rays from today demonstrate: Right femoral neck fracture, slight displacement into varus.  Previous left Girdlestone procedure.  Prior lumbar 1 level fusion, hardware in place.         Laboratory date:   CBC:  No results found for: WBC, HGB, PLT    BMP:  No results found for: NA, POTASSIUM, CHLORIDE, CO2, BUN, CR, ANIONGAP, ELBA, GLC    Inflammatory Markers:  No results found for: WBC, CRP, SED

## 2020-05-29 NOTE — ED TRIAGE NOTES
Pt BIBA c/o right hip pain, pt had fall on 5/23 with a fracture of femoral head. Pt having poor pain control.

## 2020-05-29 NOTE — CONSULTS
[facility]  Palliative Care Consultation Note    Patient: Bharati Crane  Date of Admission:  5/28/2020    Requesting Clinician / Team: trauma team  Reason for consult: Goals of care  Decisional support  Patient and family support    Recommendations:    Pain control - trauma plans to have catheter placed so that Bharati does not need systemic opioids. Pain control has been very severe and difficult to control according to daughter's report. Nursing notes severe pain in ED. When I saw Bharati she had just received hydromorphone and pain was much better controlled but she was not oriented to person or place. Pain control with regional anesthesia would be best for this 89 yr old with dementia. RAPS team consulted.     Opioid induced constipation prevention - would increase senna and miralax - ordered for you    Goals of care - Bharati has had a significant decline in function since January with left hip fracture with multiple complications and persistent pain and now with non surgical fracture of the right hip. She has had uncontrolled pain for a while now according to her daughters and now with new right hip fracture, is in severe pain due to this new fracture. Pain control is initial step as per trauma / ortho. Once pain is managed, family would like to have discussion with teams to help determine if Bharati will be able to rehab enough to be able to sit in a chair and or transfer independently every again. They worry that if she will not be able to get out of bed, she will not be able to be engaged with life as she used to be and that will be very difficult for her. If that is the case, they would like to consider discharging to a facility with plan to focus on her comfort.     DNR/DNI     These recommendations have been discussed with trauma team.      Thank you for the opportunity to participate in the care of this patient and family. Our team: will continue to follow.     During  regular M-F work hours -- if you are not sure who specifically to contact -- please contact us by sending a text page to our team consult pager at [phone].    After regular work hours and on weekends/holidays, you can call our answering service at [phone]. Also, who's on call for us is available in [employer].       Assessments:  Bharati Crane is a 89 year old female with a past medical history significant dementia, obstructive sleep apnea, osteoporosis, hypertension, previous left femoral neck fracture status post Girdlestone, now nonambulatory who present to the ER with hip pain after a fall during a transfer and workup revealed a right femoral neck fracture.     Today, the patient was seen for:  Femoral neck fracture  Pain due to above      Prognosis, Goals, & Planning:      Functional Status just prior to hospitalization: 3 (Capable of only limited self-care; needs help with ADLs; in bed/chair >50% of waking hours) - she was increasingly debilitated since her fall / fracture in 1/2020 and over past 1-2 months really not supposed to weight bear but she would forget this and continued to try and get out of bed or out of her chair.       Prognosis, Goals, and/or Advance Care Planning were addressed today: Yes   Discussed with her daughters that Bharati will likely not be able to get out of bed and if this is the case, her chance of dying within the next 6 months is very high. Her daughters have been told that because of so much pain with left hip fracture, that surgery on the new right hip, does not make sense. Daughters state that she would want, more than anything, to die in her sleep without pain. Currently she lives in a long term care unit and they will keep her room for her. Daughters are thinking about a hospice care facility or hospice on discharge.     3 grandkids and 8 great grandkids;       Patient's decision making preferences: not assessed          Patient has decision-making capacity today  for complex decisions: No            I have concerns about the patient/family's health literacy today: No           Patient has a completed Health Care Directive: No.       Code status: DNR/DNI    Coping, Meaning, & Spirituality:   Mood, coping, and/or meaning in the context of serious illness were addressed today: Yes      Social:       Complex at [facility] living there since ;   ; second   in . Past 2 years, until , was fairly independent - used walker and would walk three buildings away to get coffee and would talk to everyone along the way. She lived in her own apartment, cleaned the apartment.      Broke hip in 2020 and had 4 surgeries moved to [facility] in March and wasn't able to do much of the things she used to do. Was in so much pain since these surgeries and with COVID lockdown, wasn't getting care that needed and was in a lot of pain and no nursing care. Lots of pain past months and wasn't supposed to walk on leg. When she fell this most recent time, pain was so horrible that sent to the ER. She was in so much pain that she wanted to die.     Daughters describe her as a person who used to feel that everyday was a celebration of life .     I spoke with Princess and Zenia by phone. Other daughter, Ashly lives in [place]; brother  age 49.     History of Present Illness:  History gathered today from: family/loved ones, medical chart, medical team members    Bharati Crane is a 89 year old female with a history of dementia, DONALD on CPAP, osteoporosis, and HTN who presents to the ER on  with right hip pain and was found to have right femoral neck fracture.      Per ER note this admission, patient had left femoral neck fracture on 2020 s/p ORIF 2020 s/p left hemiarthroplasty on 3/7/2020 2/2 pin failure. Patient then had a mechanical fall leading to dislocation and closed reduction with conscious sedation in ED x2 on 3/16/2020 and 3/19/2020  s/p left hip explant hemiarthoplasty with Girdlestone procedure on 3/20/2020. Patient had another fall recently on 5/23/2020 reporting left hip to knee pain.      Key Palliative Symptom Data:  Pain location: right hip and entire leg      ROS:  Besides above, a complete 10+ ROS was reviewed and is unremarkable      Past Medical History:  No past medical history on file.     Past Surgical History:  No past surgical history on file.      Family History:  No family history on file.      Allergies:  Allergies   Allergen Reactions     Cortisone      Documented in long-term care facility MAR - no reaction noted.     Lidocaine      Documented in long-term care facility MAR - no reaction noted.     Triamcinolone Acetonide [Triamcinolone]      Documented in long-term care facility MAR - no reaction noted.        Medications:  I have reviewed this patient's medication profile and medications from this hospitalization.       Physical Exam:  Vital Signs: Temp: 98.6  F (37  C) Temp src: Oral BP: (!) 143/61 Pulse: 86 Heart Rate: 97 Resp: 20 SpO2: 94 % O2 Device: None (Room air)    Weight: 133 lbs 6.4 oz  Gen: alert, well groomed, appears stated age, well nourished, in NAD  Eyes: Conjunctiva clear. Sclera anicteric .  HENT: NCAT; no temporal wasting, mucous membranes moist  Resp: no increased work of breathing, speaking full sentences  Msk: no gross deformity, no sarcopenia  Skin:  no jaundice  Ext: warm, well perfused. Right leg swollen from hip to ankle  Mental status/psych: alert, only oriented to self. Pleasant, affect full bright, y intact; sensorium not intact    Data reviewed:  Reviewed recent labs and pertinent imaging      Thank you for the opportunity to continue to participate in the care of this patient and family.  Please feel free to contact on-call palliative provider with any emergent needs.  We can be reached via team pager [phone] (answered 8-4:30 Monday-Friday); after-hours answering service ([phone]);      Jennifer Simon MD / Palliative Medicine / Pager [phone] / [facility] Inpatient Team Consult Pager [phone] (answered 8am-430pm M-F) - ok to text page via [employer] / After-Hours Answering Service [phone] / Palliative Clinic in the [facility] [place] [facility] [phone] (scheduling); [phone] (triage).  TT: 70 minutes, with > 50% spent in counseling / coordination of care / education with patient/family/care teams re: GOC, POC, Sx management.

## 2020-05-29 NOTE — PROGRESS NOTES
Social Work Services Progress Note    Hospital Day: 2  Date of Initial Social Work Evaluation:  Not yet completed  Collaborated with:  Chart review, team rounds, [facility] (p. [phone], f. [phone], have access to Epic)    Data:  Pt is a 89 year old female with dementia whom appears to have admitted from [facility] after falling and sustaining a right femoral neck fracture.     Per chart review pt may be ready for discharge in 2-3 days.     Intervention:  KEI contacted [facility]- spoke with Nataliia in admissions and pt has a LTC bed hold.      Assessment:  See bedside RN, PT/OT, medical team notes    Plan:    Anticipated Disposition:  Facility:  Return to LTC at [facility]    Barriers to d/c plan:  Medical stability    Follow Up:  KEI CARRERO will continue to follow    DORETHA Montenegro, [address]   [phone] (pager) [phone]  5/29/2020

## 2020-05-29 NOTE — PROGRESS NOTES
Brief Ortho Note    Reviewed case and discussed options with Bharati's daugther's.  Agreed to pursue non-operative management.  Patient is non-ambulatory and required several operations after her left femoral neck fracture.        Recommendations  1. Palliative Care consult- discussed with daughter  2. Regional Anesthesia consult- possible indwelling pain catheter  3. Ok for up in chair  4. Judicious pain control  5. Skin cares/frequent mini-turns to prevent pressure injury  6. PT- limit movement of right leg as much as possible for comfort, any positioning is ok      Discussed with Dr. Galen Reyes MD  Orthopedic Surgery, PGY-4  Pager: [phone]  10:58 AM  May 29, 2020

## 2020-05-30 ENCOUNTER — APPOINTMENT (OUTPATIENT)
Dept: PHYSICAL THERAPY | Facility: CLINIC | Age: 85
DRG: 536 | End: 2020-05-30
Attending: NURSE PRACTITIONER
Payer: MEDICARE

## 2020-05-30 LAB
ANION GAP SERPL CALCULATED.3IONS-SCNC: 6 MMOL/L (ref 3–14)
BUN SERPL-MCNC: 11 MG/DL (ref 7–30)
CALCIUM SERPL-MCNC: 8.2 MG/DL (ref 8.5–10.1)
CHLORIDE SERPL-SCNC: 99 MMOL/L (ref 94–109)
CO2 SERPL-SCNC: 25 MMOL/L (ref 20–32)
CREAT SERPL-MCNC: 0.48 MG/DL (ref 0.52–1.04)
ERYTHROCYTE [DISTWIDTH] IN BLOOD BY AUTOMATED COUNT: 14.3 % (ref 10–15)
GFR SERPL CREATININE-BSD FRML MDRD: 83 ML/MIN/{1.73_M2}
GLUCOSE SERPL-MCNC: 94 MG/DL (ref 70–99)
HCT VFR BLD AUTO: 33.4 % (ref 35–47)
HGB BLD-MCNC: 10.9 G/DL (ref 11.7–15.7)
MCH RBC QN AUTO: 30.8 PG (ref 26.5–33)
MCHC RBC AUTO-ENTMCNC: 32.6 G/DL (ref 31.5–36.5)
MCV RBC AUTO: 94 FL (ref 78–100)
PLATELET # BLD AUTO: 311 10E9/L (ref 150–450)
POTASSIUM SERPL-SCNC: 3.9 MMOL/L (ref 3.4–5.3)
RBC # BLD AUTO: 3.54 10E12/L (ref 3.8–5.2)
SODIUM SERPL-SCNC: 129 MMOL/L (ref 133–144)
WBC # BLD AUTO: 5 10E9/L (ref 4–11)

## 2020-05-30 PROCEDURE — 36415 COLL VENOUS BLD VENIPUNCTURE: CPT | Performed by: SURGERY

## 2020-05-30 PROCEDURE — 97161 PT EVAL LOW COMPLEX 20 MIN: CPT | Mod: GP

## 2020-05-30 PROCEDURE — 25000128 H RX IP 250 OP 636: Performed by: SURGERY

## 2020-05-30 PROCEDURE — 25000125 ZZHC RX 250

## 2020-05-30 PROCEDURE — 25000132 ZZH RX MED GY IP 250 OP 250 PS 637: Mod: GY | Performed by: INTERNAL MEDICINE

## 2020-05-30 PROCEDURE — 85027 COMPLETE CBC AUTOMATED: CPT | Performed by: SURGERY

## 2020-05-30 PROCEDURE — 80048 BASIC METABOLIC PNL TOTAL CA: CPT | Performed by: SURGERY

## 2020-05-30 PROCEDURE — 25800030 ZZH RX IP 258 OP 636: Performed by: NURSE PRACTITIONER

## 2020-05-30 PROCEDURE — 25000132 ZZH RX MED GY IP 250 OP 250 PS 637: Mod: GY | Performed by: SURGERY

## 2020-05-30 PROCEDURE — 27110038 ZZH RX 271

## 2020-05-30 PROCEDURE — 25000132 ZZH RX MED GY IP 250 OP 250 PS 637: Mod: GY | Performed by: NURSE PRACTITIONER

## 2020-05-30 PROCEDURE — 12000001 ZZH R&B MED SURG/OB UMMC

## 2020-05-30 PROCEDURE — 97530 THERAPEUTIC ACTIVITIES: CPT | Mod: GP

## 2020-05-30 RX ORDER — NALOXONE HYDROCHLORIDE 0.4 MG/ML
.1-.4 INJECTION, SOLUTION INTRAMUSCULAR; INTRAVENOUS; SUBCUTANEOUS
Status: DISCONTINUED | OUTPATIENT
Start: 2020-05-30 | End: 2020-06-04 | Stop reason: HOSPADM

## 2020-05-30 RX ORDER — FENTANYL CITRATE 50 UG/ML
25-50 INJECTION, SOLUTION INTRAMUSCULAR; INTRAVENOUS
Status: DISCONTINUED | OUTPATIENT
Start: 2020-05-30 | End: 2020-06-02

## 2020-05-30 RX ORDER — BUPIVACAINE HYDROCHLORIDE 2.5 MG/ML
INJECTION, SOLUTION EPIDURAL; INFILTRATION; INTRACAUDAL PRN
OUTPATIENT
Start: 2020-05-30

## 2020-05-30 RX ORDER — AMOXICILLIN 250 MG
2-3 CAPSULE ORAL 2 TIMES DAILY
Status: DISCONTINUED | OUTPATIENT
Start: 2020-05-30 | End: 2020-06-04 | Stop reason: HOSPADM

## 2020-05-30 RX ORDER — POLYETHYLENE GLYCOL 3350 17 G/17G
17 POWDER, FOR SOLUTION ORAL DAILY
Status: DISCONTINUED | OUTPATIENT
Start: 2020-05-30 | End: 2020-06-03

## 2020-05-30 RX ORDER — FLUMAZENIL 0.1 MG/ML
0.2 INJECTION, SOLUTION INTRAVENOUS
Status: DISCONTINUED | OUTPATIENT
Start: 2020-05-30 | End: 2020-06-04 | Stop reason: HOSPADM

## 2020-05-30 RX ADMIN — BUPIVACAINE HYDROCHLORIDE 30 ML: 2.5 INJECTION, SOLUTION EPIDURAL; INFILTRATION; INTRACAUDAL at 13:30

## 2020-05-30 RX ADMIN — ACETAMINOPHEN 975 MG: 325 TABLET, FILM COATED ORAL at 08:29

## 2020-05-30 RX ADMIN — LOSARTAN POTASSIUM 50 MG: 50 TABLET, FILM COATED ORAL at 08:29

## 2020-05-30 RX ADMIN — SENNOSIDES, DOCUSATE SODIUM 2 TABLET: 8.6; 5 TABLET ORAL at 21:10

## 2020-05-30 RX ADMIN — Medication 0.2 MG: at 08:17

## 2020-05-30 RX ADMIN — SODIUM CHLORIDE: 9 INJECTION, SOLUTION INTRAVENOUS at 18:54

## 2020-05-30 RX ADMIN — ACETAMINOPHEN 975 MG: 325 TABLET, FILM COATED ORAL at 13:34

## 2020-05-30 RX ADMIN — Medication 0.2 MG: at 03:39

## 2020-05-30 RX ADMIN — Medication: at 13:20

## 2020-05-30 RX ADMIN — METHOCARBAMOL 750 MG: 750 TABLET, FILM COATED ORAL at 13:34

## 2020-05-30 RX ADMIN — METHOCARBAMOL 750 MG: 750 TABLET, FILM COATED ORAL at 08:29

## 2020-05-30 RX ADMIN — ROPINIROLE HYDROCHLORIDE 1 MG: 1 TABLET, FILM COATED ORAL at 21:10

## 2020-05-30 RX ADMIN — SODIUM CHLORIDE: 9 INJECTION, SOLUTION INTRAVENOUS at 08:40

## 2020-05-30 RX ADMIN — OXYCODONE HYDROCHLORIDE 5 MG: 5 TABLET ORAL at 21:10

## 2020-05-30 RX ADMIN — THERA TABS 1 TABLET: TAB at 08:29

## 2020-05-30 RX ADMIN — LEVOTHYROXINE SODIUM 75 MCG: 0.07 TABLET ORAL at 21:10

## 2020-05-30 RX ADMIN — SENNOSIDES, DOCUSATE SODIUM 1 TABLET: 8.6; 5 TABLET ORAL at 08:29

## 2020-05-30 RX ADMIN — ACETAMINOPHEN 975 MG: 325 TABLET, FILM COATED ORAL at 21:10

## 2020-05-30 RX ADMIN — CALCIUM 500 MG: 500 TABLET ORAL at 08:29

## 2020-05-30 RX ADMIN — OXYCODONE HYDROCHLORIDE 5 MG: 5 TABLET ORAL at 14:52

## 2020-05-30 RX ADMIN — AMLODIPINE BESYLATE 5 MG: 5 TABLET ORAL at 08:29

## 2020-05-30 RX ADMIN — METHOCARBAMOL 750 MG: 750 TABLET, FILM COATED ORAL at 21:21

## 2020-05-30 RX ADMIN — CALCIUM 500 MG: 500 TABLET ORAL at 21:10

## 2020-05-30 RX ADMIN — MELATONIN 2000 UNITS: at 08:29

## 2020-05-30 RX ADMIN — Medication 0.2 MG: at 12:08

## 2020-05-30 RX ADMIN — OXYCODONE HYDROCHLORIDE 5 MG: 5 TABLET ORAL at 09:10

## 2020-05-30 ASSESSMENT — ACTIVITIES OF DAILY LIVING (ADL)
FALL_HISTORY_WITHIN_LAST_SIX_MONTHS: YES
ADLS_ACUITY_SCORE: 34
TRANSFERRING: 2-->ASSISTIVE PERSON
BATHING: 2-->ASSISTIVE PERSON
AMBULATION: 4-->COMPLETELY DEPENDENT
SWALLOWING: 0-->SWALLOWS FOODS/LIQUIDS WITHOUT DIFFICULTY
TOILETING: 2-->ASSISTIVE PERSON
RETIRED_COMMUNICATION: 0-->UNDERSTANDS/COMMUNICATES WITHOUT DIFFICULTY
DRESS: 2-->ASSISTIVE PERSON
ADLS_ACUITY_SCORE: 29
NUMBER_OF_TIMES_PATIENT_HAS_FALLEN_WITHIN_LAST_SIX_MONTHS: 3
ADLS_ACUITY_SCORE: 30
ADLS_ACUITY_SCORE: 30
COGNITION: 1 - ATTENTION OR MEMORY DEFICITS
ADLS_ACUITY_SCORE: 30
ADLS_ACUITY_SCORE: 30
RETIRED_EATING: 0-->INDEPENDENT
WHICH_OF_THE_ABOVE_FUNCTIONAL_RISKS_HAD_A_RECENT_ONSET_OR_CHANGE?: AMBULATION;FALL HISTORY

## 2020-05-30 ASSESSMENT — MIFFLIN-ST. JEOR: SCORE: 895.29

## 2020-05-30 NOTE — PLAN OF CARE
PT Order received and appreciated. Per check in with RN pt is in significant pain and awaiting pain consult, not appropriate for moving with therapy at this time. PT will check back late am/pm to see if pain improving.

## 2020-05-30 NOTE — PLAN OF CARE
Time: 1900 - 0730  Reason for Admission: Right femoral neck fracture after a fall during a transfer  Vitals: BP (!) 175/72 (BP Location: Right arm)   Pulse 84   Temp 98.1  F (36.7  C) (Axillary)   Resp 18   Ht 1.524 m (5')   Wt 60.5 kg (133 lb 6.4 oz)   SpO2 96%   BMI 26.05 kg/m       Activity: Pt remained in bed. Repositioned Q2 as pt would allow d/t pain.  Pain: Pt's pain increased after repositioning. PRN dilaudid given x 2 and oxy given x 1 with some relief. Pain team paged x 2 with no response.  Neuro: Disoriented to time, place, and situation. Pleasantly confused. Denies numbness or tingling.   Cardiac: Hypertensive - within parameters. Denies cardiac chest pain.  Respiratory: LS clear - slightly diminished in bases. On RA. Denies SOB.  GI/: Incontinent of urine x 2. Purewick placed. BS+. No BM this shift.   Diet: Regular diet  Skin: Scattered bruising present. 2+ edema present in (R) leg. Otherwise skin intact.  LDAs: (L) PIV infusing NS at 100 mL/hr.   New change for this shift: None.  Plan: Continue with pain management and POC.

## 2020-05-30 NOTE — ANESTHESIA PROCEDURE NOTES
Peripheral Nerve Block Procedure Note  Staff -   Anesthesiologist:  Marylu Mcintosh MD  Resident/Fellow: Daryl Dutton MD    Performed By: anesthesiologist and with residents  Procedure performed by resident/CRNA in presence of a teaching physician.        Location: Floor  Procedure Start/Stop TImes:      5/30/2020 1:00 PM     5/30/2020 1:30 PM    patient identified, IV checked, site marked, risks and benefits discussed, informed consent, monitors and equipment checked, pre-op evaluation, at physician/surgeon's request and post-op pain management      Correct Patient: Yes      Correct Position: Yes      Correct Site: Yes      Correct Procedure: Yes      Correct Laterality:  Yes    Site Marked:  Yes  Procedure details:     Nerve block type: Fascia Iliaca Plane Block.    ASA:  3    Diagnosis:  Femoral neck fracture    Laterality:  Right    Position:  Supine    Sterile Prep: chloraprep      Local skin infiltration:  2% lidocaine    amount (mL):  2    Needle:  Touhy needle    Needle gauge:  17    Needle length (inches):  3.1    Catheter gauge:  19    Catheter threaded easily: Yes      Threaded to cm at skin:  10    Ultrasound: Yes      Ultrasound used to identify targeted nerve, plexus, or vascular structure and placed a needle adjacent to it      Permanent Image entered into patiient's record      Abnormal pain on injection: No      Blood Aspirated: No      Paresthesias:  No    Bleeding at site: No      Bolus via:  Catheter    Infusion Method:  Continuous Infusion    Blood aspirated via catheter: No      Secured:  Dermabond, Tegaderm and Steristrips    Complications:  None

## 2020-05-30 NOTE — PROGRESS NOTES
[facility], [place]    Pain Service Consultation     Date of Admission:  5/28/2020  Date of Consult (When I saw the patient): 05/30/20    Assessment & Plan   Bharati Crane is a 89 year old female who was admitted on 5/28/2020. I was asked to see the patient for pain control.    Plan:  - Will place right-sided fascia iliaca catheter with Ropivacaine infusion  - Consider placement of lumbar epidural pending pain control with multimodal regimen and newly placed catheter.  - Continue current pain regimen as ordered by primary team  - Okay to start Lovenox  - Please do not hesitate to contact RAPS service with questions or concerns    Active Problems:    Femur fracture, right (H)    Right femoral fracture (H)      Daryl Nato    Code Status    DNR/DNI    Reason for Consult   Reason for consult: Pain s/p fall resulting in right femoral neck fracture    Primary Care Physician    ILANA SAMANIEGO    Chief Complaint   Right knee pain, Right groin pain, bilateral lower back pain    History is obtained from the patient and electronic health record    History of Present Illness   Bharati Crane is a 89 year old female who presents with right femoral neck fracture. Planning for non operative management at this time per orthopedic surgery. Patient notably in significant pain following fracture, most sever in right back, groin and knee.      Past Medical History   I have reviewed this patient's medical history and updated it with pertinent information if needed.     Past Surgical History   I have reviewed this patient's surgical history and updated it with pertinent information if needed.    Prior to Admission Medications   Prior to Admission Medications   Prescriptions Last Dose Informant Patient Reported? Taking?   White Petrolatum-Mineral Oil (ARTIFICIAL TEARS) 83-15 % OINT 5/27/2020 at 2000  Yes Yes   Sig: Place into both eyes At Bedtime   acetaminophen (TYLENOL) 500 MG tablet 5/28/2020 at 1600   Yes Yes   Sig: Take 1,000 mg by mouth 2 times daily   amLODIPine (NORVASC) 5 MG tablet 5/28/2020 at 1600  Yes Yes   Sig: Take 5 mg by mouth daily   calcium carbonate 500 mg, elemental, (OSCAL) 500 MG tablet 5/28/2020 at 0800  Yes Yes   Sig: Take 1 tablet by mouth 2 times daily   fluticasone (FLONASE) 50 MCG/ACT nasal spray 5/27/2020 at 1600  Yes Yes   Sig: Spray 2 sprays into both nostrils At Bedtime   levothyroxine (SYNTHROID/LEVOTHROID) 75 MCG tablet 5/27/2020 at 1600  Yes Yes   Sig: Take 75 mcg by mouth every evening   losartan (COZAAR) 50 MG tablet 5/28/2020 at 1600  Yes Yes   Sig: Take 50 mg by mouth daily   melatonin 3 MG tablet 5/27/2020 at 2000  Yes Yes   Sig: Take 6 mg by mouth At Bedtime   multivitamin, therapeutic (THERA-VIT) TABS tablet 5/28/2020 at 0800  Yes Yes   Sig: Take 1 tablet by mouth daily   oxyCODONE (ROXICODONE) 5 MG tablet 5/28/2020 at 1400  Yes Yes   Sig: Take 5 mg by mouth every 4 hours as needed for severe pain   polyethylene glycol (MIRALAX) 17 g packet 5/28/2020 at 0800  Yes Yes   Sig: Take 1 packet by mouth daily   rOPINIRole (REQUIP) 0.25 MG tablet 5/28/2020 at 1200  Yes Yes   Sig: Take 0.25 mg by mouth 3 times daily   rOPINIRole (REQUIP) 1 MG tablet 5/27/2020 at 2000  Yes Yes   Sig: Take 1 mg by mouth At Bedtime   senna (SENOKOT) 8.6 MG tablet 5/16/2020 at 1600  Yes Yes   Sig: Take 1 tablet by mouth 2 times daily as needed for constipation   vitamin D3 (CHOLECALCIFEROL) 2000 units (50 mcg) tablet 5/28/2020 at 0800  Yes Yes   Sig: Take 1 tablet by mouth daily      Facility-Administered Medications: None     Allergies   Allergies   Allergen Reactions     Cortisone      Documented in long-term care facility MAR - no reaction noted.     Lidocaine      Documented in long-term care facility MAR - no reaction noted.     Triamcinolone Acetonide [Triamcinolone]      Documented in long-term care facility MAR - no reaction noted.         Review of Systems   The 10 point Review of Systems is  negative other than noted in the HPI or here.     Physical Exam   Temp: 36.8  C (98.3  F) Temp src: Oral BP: 137/54 Pulse: 92 Heart Rate: 84 Resp: 18 SpO2: 95 % O2 Device: None (Room air)    Vital Signs with Ranges  Temp:  [36.6  C (97.8  F)-37.1  C (98.7  F)] 36.8  C (98.3  F)  Pulse:  [82-92] 92  Heart Rate:  [84-91] 84  Resp:  [18-20] 18  BP: (103-179)/(50-79) 137/54  SpO2:  [94 %-96 %] 95 %  133 lbs 6.4 oz    Gen: Awake, alert, lying in bed with limited mobility  CV: Hemodynamically stable  Pulm: Breathing comfortably on RA  Extremities: Reports pain in lower extremities

## 2020-05-30 NOTE — PLAN OF CARE
Activity: A2 of repositioning, pt refused repositioning upon arrival to the unit and slept for a majority of the shift.  Neuros: Oriented to self only, pleasantly confused.  Cardiac: WDL, HTN. Denies chest pain.  Respiratory: WDL, stable on RA, denies SOB.  GI/: Incontinent of bowel/bladder  Diet: Regular  Skin: Warm, dry, intact. JEAN PIERRE back side, pt unable to turn.  Lines: L PIV infusing, R PIV SL  Labs: Reviewed.  Pain/nausea:  Pt complained of pain upon arrival, but fell asleep before RN administered pain medication. Pt awoke after a couple of hours and was comfortable. Denies nausea.  New changes this shift:  Arrived from ED around 1430 from a mechanical fall assisted living facility. No surgical interventions at this time. Pain team available over night if pain become unmanageable.   Plan: Continue to monitor and follow POC.

## 2020-05-30 NOTE — PLAN OF CARE
OT: per PT patient in significant pain, will hold OT evaluation until PT able to assess patient as she will only tolerate one therapy currently.

## 2020-05-30 NOTE — PROGRESS NOTES
[facility]   Trauma Surgery Progress Note    Date of Service (when I saw the patient): 05/30/2020     Assessment & Plan     Trauma mechanism: Fall  Time/date of injury: 5/29/20 3am    Known Injuries:  1. Right femoral neck fracture    Neuro/Pain:  # Acute pain  - scheduled tylenol and robaxin. PRN dilaudid and oxycodone  - RAPS team consulted. Planning to place catheter for pain this afternoon. Will resume Lovenox tomorrow morning after catheter placement    # Dementia  # anxiety  - Monitor neurological status.   - Maintain circadian rhythm.  Lights on during the day.  Off at night, minimize cares at night.  OOB during the day.    Pulmonary:  # DONALD with CPAP use  - continue CPAP use  - Supplemental oxygen to keep saturation above 92 %.  - Aggressive pulmonary hygiene. Incentive spirometer while awake     Cardiovascular:    # HTN  - continue PTA meds    GI/Nutrition:    # No acute issues  - OK for regular diet    Renal/ Fluids/Electrolytes:  # Hyponatremia   - NS infusion  - admit with a Cr of 0.55 and GFR 80  - electrolyte replacement protocol  In place.   - Will recheck BMP tomorrow morning    Endocrine:  # Hypothyroidism  - continue PTA medications  - Goal to keep BG < 180 for optimal wound healing     Infectious disease:   # no acute issues  - WBC 5.8  - UA ordered, not collected yet  - No indications for antibiotics.     Hematology:    # Anemia   - Admitted with Hb of 11.2  - Will recheck CBC tomorrow    - Threshold for transfusion if hgb <7.0 or signs/symptoms of hypoperfusion.       Musculoskeletal:  # Right femoral neck fracture  - non-operative  - orthopedic consulted   - Physical and occupational therapy consults    #Back Pain  - Will require complete tertiary exam when hip pain is adequately controlled    Skin:  #  No acute issues  - dilgent cares to prevent skin breakdown and wound formation.      Lines/ tubes/ drains:  - PIV    Code status:   DNR/DNI    General Cares:    PPI/H2 blocker:  N/A   DVT prophylaxis: Resume lovenox on 5/31   Bowel Regimen/Date of last stool: 5/28/20   Pulmonary toilet: IS   ETOH screen completed: yes    Discharge goals:     Adequate pain management: on-going    VSS x24 hours: on-going    Hemoglobin stable x 48 hours: ongoing    Ambulating safely and/or therapy evals complete: ordered    Drains/lines removed or plan in place to manage: yes    Teaching done: on-going    Expected D/C date: TBD, pending pain control, tertiary survey    Interval History     Patient is poor historian. Alert to person, not to place or time. Still in significant discomfort, requesting food. No acute events overnight    Physical Exam    Temp: 97.8  F (36.6  C) Temp src: Oral BP: (!) 179/79 Pulse: 92 Heart Rate: 91 Resp: 18 SpO2: 94 % O2 Device: None (Room air)    Vitals:    05/28/20 2025   Weight: 60.5 kg (133 lb 6.4 oz)     Vital Signs with Ranges  Temp:  [97.8  F (36.6  C)-98.7  F (37.1  C)] 97.8  F (36.6  C)  Pulse:  [82-92] 92  Heart Rate:  [91] 91  Resp:  [18-20] 18  BP: (103-179)/(50-79) 179/79  SpO2:  [93 %-97 %] 94 %  No intake/output data recorded.    Gen: Awake, alert, lying in bed with limited mobility  CV: Hemodynamically stable, no cyanosis  Pulm: Breathing comfortably on room air  Extremities: Reports pain in lower extremities, no edema noted bilaterally    Labs:  Hgb: 10.9  WBC: 5.0  Cr: 0.48    Alex Larios MD

## 2020-05-31 LAB
ANION GAP SERPL CALCULATED.3IONS-SCNC: 8 MMOL/L (ref 3–14)
BUN SERPL-MCNC: 10 MG/DL (ref 7–30)
CALCIUM SERPL-MCNC: 8.3 MG/DL (ref 8.5–10.1)
CHLORIDE SERPL-SCNC: 98 MMOL/L (ref 94–109)
CO2 SERPL-SCNC: 22 MMOL/L (ref 20–32)
CREAT SERPL-MCNC: 0.45 MG/DL (ref 0.52–1.04)
ERYTHROCYTE [DISTWIDTH] IN BLOOD BY AUTOMATED COUNT: 14.2 % (ref 10–15)
GFR SERPL CREATININE-BSD FRML MDRD: 85 ML/MIN/{1.73_M2}
GLUCOSE SERPL-MCNC: 94 MG/DL (ref 70–99)
HCT VFR BLD AUTO: 33.8 % (ref 35–47)
HGB BLD-MCNC: 10.9 G/DL (ref 11.7–15.7)
MCH RBC QN AUTO: 30.4 PG (ref 26.5–33)
MCHC RBC AUTO-ENTMCNC: 32.2 G/DL (ref 31.5–36.5)
MCV RBC AUTO: 94 FL (ref 78–100)
PLATELET # BLD AUTO: 298 10E9/L (ref 150–450)
POTASSIUM SERPL-SCNC: 3.7 MMOL/L (ref 3.4–5.3)
RBC # BLD AUTO: 3.58 10E12/L (ref 3.8–5.2)
SODIUM SERPL-SCNC: 128 MMOL/L (ref 133–144)
WBC # BLD AUTO: 5.9 10E9/L (ref 4–11)

## 2020-05-31 PROCEDURE — 25000132 ZZH RX MED GY IP 250 OP 250 PS 637: Mod: GY | Performed by: STUDENT IN AN ORGANIZED HEALTH CARE EDUCATION/TRAINING PROGRAM

## 2020-05-31 PROCEDURE — 80048 BASIC METABOLIC PNL TOTAL CA: CPT | Performed by: STUDENT IN AN ORGANIZED HEALTH CARE EDUCATION/TRAINING PROGRAM

## 2020-05-31 PROCEDURE — 25000132 ZZH RX MED GY IP 250 OP 250 PS 637: Mod: GY | Performed by: SURGERY

## 2020-05-31 PROCEDURE — 25000132 ZZH RX MED GY IP 250 OP 250 PS 637: Mod: GY | Performed by: INTERNAL MEDICINE

## 2020-05-31 PROCEDURE — 12000001 ZZH R&B MED SURG/OB UMMC

## 2020-05-31 PROCEDURE — 36415 COLL VENOUS BLD VENIPUNCTURE: CPT | Performed by: STUDENT IN AN ORGANIZED HEALTH CARE EDUCATION/TRAINING PROGRAM

## 2020-05-31 PROCEDURE — 25000128 H RX IP 250 OP 636: Performed by: SURGERY

## 2020-05-31 PROCEDURE — 85027 COMPLETE CBC AUTOMATED: CPT | Performed by: STUDENT IN AN ORGANIZED HEALTH CARE EDUCATION/TRAINING PROGRAM

## 2020-05-31 PROCEDURE — 25000132 ZZH RX MED GY IP 250 OP 250 PS 637: Mod: GY | Performed by: NURSE PRACTITIONER

## 2020-05-31 RX ORDER — BISACODYL 10 MG
10 SUPPOSITORY, RECTAL RECTAL ONCE
Status: COMPLETED | OUTPATIENT
Start: 2020-05-31 | End: 2020-05-31

## 2020-05-31 RX ORDER — NALOXONE HYDROCHLORIDE 0.4 MG/ML
.1-.4 INJECTION, SOLUTION INTRAMUSCULAR; INTRAVENOUS; SUBCUTANEOUS
Status: DISCONTINUED | OUTPATIENT
Start: 2020-05-31 | End: 2020-06-03

## 2020-05-31 RX ADMIN — Medication 0.2 MG: at 12:29

## 2020-05-31 RX ADMIN — MELATONIN 2000 UNITS: at 07:48

## 2020-05-31 RX ADMIN — THERA TABS 1 TABLET: TAB at 07:48

## 2020-05-31 RX ADMIN — ENOXAPARIN SODIUM 30 MG: 30 INJECTION SUBCUTANEOUS at 12:29

## 2020-05-31 RX ADMIN — ROPINIROLE HYDROCHLORIDE 1 MG: 1 TABLET, FILM COATED ORAL at 21:03

## 2020-05-31 RX ADMIN — ACETAMINOPHEN 975 MG: 325 TABLET, FILM COATED ORAL at 21:03

## 2020-05-31 RX ADMIN — LEVOTHYROXINE SODIUM 75 MCG: 0.07 TABLET ORAL at 21:03

## 2020-05-31 RX ADMIN — ACETAMINOPHEN 975 MG: 325 TABLET, FILM COATED ORAL at 14:42

## 2020-05-31 RX ADMIN — OXYCODONE HYDROCHLORIDE 5 MG: 5 TABLET ORAL at 21:03

## 2020-05-31 RX ADMIN — OXYCODONE HYDROCHLORIDE 5 MG: 5 TABLET ORAL at 09:56

## 2020-05-31 RX ADMIN — CALCIUM 500 MG: 500 TABLET ORAL at 07:48

## 2020-05-31 RX ADMIN — CALCIUM 500 MG: 500 TABLET ORAL at 21:03

## 2020-05-31 RX ADMIN — METHOCARBAMOL 750 MG: 750 TABLET, FILM COATED ORAL at 21:03

## 2020-05-31 RX ADMIN — LOSARTAN POTASSIUM 50 MG: 50 TABLET, FILM COATED ORAL at 07:48

## 2020-05-31 RX ADMIN — AMLODIPINE BESYLATE 5 MG: 5 TABLET ORAL at 07:48

## 2020-05-31 RX ADMIN — Medication 0.2 MG: at 04:42

## 2020-05-31 RX ADMIN — ACETAMINOPHEN 975 MG: 325 TABLET, FILM COATED ORAL at 07:48

## 2020-05-31 RX ADMIN — POLYETHYLENE GLYCOL 3350 17 G: 17 POWDER, FOR SOLUTION ORAL at 07:48

## 2020-05-31 RX ADMIN — METHOCARBAMOL 750 MG: 750 TABLET, FILM COATED ORAL at 07:48

## 2020-05-31 RX ADMIN — SENNOSIDES, DOCUSATE SODIUM 2 TABLET: 8.6; 5 TABLET ORAL at 07:48

## 2020-05-31 RX ADMIN — Medication 0.2 MG: at 07:47

## 2020-05-31 RX ADMIN — METHOCARBAMOL 750 MG: 750 TABLET, FILM COATED ORAL at 14:42

## 2020-05-31 RX ADMIN — BISACODYL 10 MG: 10 SUPPOSITORY RECTAL at 11:08

## 2020-05-31 RX ADMIN — OXYCODONE HYDROCHLORIDE 5 MG: 5 TABLET ORAL at 16:02

## 2020-05-31 ASSESSMENT — ACTIVITIES OF DAILY LIVING (ADL)
ADLS_ACUITY_SCORE: 34

## 2020-05-31 NOTE — PROGRESS NOTES
[facility]   Trauma Surgery Progress Note    Date of Service (when I saw the patient): 05/31/2020     Assessment & Plan     Trauma mechanism: Fall  Time/date of injury: 5/29/20 3am    Known Injuries:  1. Right femoral neck fracture    Neuro/Pain:  # Acute pain  - scheduled tylenol and robaxin. PRN dilaudid and oxycodone  - RAPS team consulted. Right-sided fascia iliaca catheter with Ropivacaine per anesthesia in place as of 5/30    # Dementia  # anxiety  - Monitor neurological status.   - Maintain circadian rhythm.  Lights on during the day.  Off at night, minimize cares at night.  OOB during the day.    Pulmonary:  # DONALD with CPAP use  - continue CPAP use  - Supplemental oxygen to keep saturation above 92 %.  - Aggressive pulmonary hygiene. Incentive spirometer while awake     Cardiovascular:    # HTN  - continue PTA meds    GI/Nutrition:    # Constipation  - OK for regular diet  - Suppository this morning  - If no BM by this afternoon, will plan for suppository    Renal/ Fluids/Electrolytes:  # Hyponatremia, stable  - NS infusion  - admit with a Cr of 0.55 and GFR 80  - electrolyte replacement protocol  In place.   - Will recheck BMP tomorrow morning    Endocrine:  # Hypothyroidism  - continue PTA medications  - Goal to keep BG < 180 for optimal wound healing     Infectious disease:   # no acute issues  - WBC 5.8  - UA ordered, not collected yet  - No indications for antibiotics.     Hematology:    # Anemia, stable  - Admitted with Hb of 11.2  - Will recheck CBC tomorrow    - Threshold for transfusion if hgb <7.0 or signs/symptoms of hypoperfusion.       Musculoskeletal:  # Right femoral neck fracture  - non-operative  - orthopedic consulted   - Physical and occupational therapy consults    #Back Pain  - Will require complete tertiary exam when hip pain is adequately controlled    Skin:  #  No acute issues  - dilgent cares to prevent skin breakdown and wound  formation.      Lines/ tubes/ drains:  - PIV    Code status:  DNR/DNI    General Cares:    PPI/H2 blocker:  N/A   DVT prophylaxis: Resume lovenox on 5/31   Bowel Regimen/Date of last stool: senna and miralax daily. 5/28/20   Pulmonary toilet: IS   ETOH screen completed: yes     Discharge goals:     Adequate pain management: on-going    VSS x24 hours: on-going    Hemoglobin stable x 48 hours: yes    Ambulating safely and/or therapy evals complete: ordered    Drains/lines removed or plan in place to manage: yes    Teaching done: on-going    Expected D/C date: TBD, pending pain control, tertiary survey    Interval History     Patient is poor historian. Alert to person, not to place or time. Still uncomfortable with exam, but appears more comfortable laying in bed compared to yesterday. No acute events overnight. Catheter placed per RAP yesterday. No bowel movement yet.    Physical Exam    Temp: 98.2  F (36.8  C) Temp src: Axillary BP: (!) 162/75 Pulse: 88 Heart Rate: 90 Resp: 18 SpO2: 96 % O2 Device: None (Room air)    Vitals:    05/28/20 2025 05/30/20 1700   Weight: 60.5 kg (133 lb 6.4 oz) 57.9 kg (127 lb 9.6 oz)     Vital Signs with Ranges  Temp:  [97.4  F (36.3  C)-98.3  F (36.8  C)] 98.2  F (36.8  C)  Pulse:  [88-92] 88  Heart Rate:  [84-90] 90  Resp:  [16-18] 18  BP: (125-179)/(49-79) 162/75  SpO2:  [94 %-97 %] 96 %  I/O last 3 completed shifts:  In: 840 [P.O.:840]  Out: 300 [Urine:300]    Gen: Awake, alert, lying in bed with limited mobility  CV: Hemodynamically stable, no cyanosis  Neck: no tenderness to palpation of the cervical spine.  Pulm: Breathing comfortably on room air  Extremities: Reports pain in lower extremities, particularly with palpation of the right knee and bilateral hips, no edema noted bilaterally.     Labs:  Hgb: 10.9  WBC: 5.9  Cr: 0.45  Sodium 128 (129 yesterday)    Meron Danielle Groshek, MD PGY-1  [facility]  General Surgery/Trauma Service  p [phone]

## 2020-05-31 NOTE — PLAN OF CARE
Activity: A2 of repositioning, repositioned as pt allowed. Pt worked with PT and sat edge of bed for a short time.  Neuros: Oriented to self and sometimes situation, pleasantly confused.  Cardiac: WDL, HTN. Denies chest pain.  Respiratory: WDL, stable on RA, denies SOB.  GI/: Incontinent of bowel/bladder. Purewick in place.  Diet: Regular, tolerating  Skin: Warm, dry, intact. New new deficits noted.  Lines: L PIV infusing, R PIV SL  Labs: Reviewed.  Pain/nausea:  Pain relieved with PRN oxycodone and IV dilaudid. Pt also has scheduled tylenol and robaxin. On-Q @ 14 mL/hr in R groin. Pt denies nausea.  New changes this shift:  Nerve block placed in R groin at the bedside by anesthesia. Pt tolerated procedure and has verbalized some relief from the block.   Plan: Continue to monitor and follow POC.

## 2020-05-31 NOTE — PROGRESS NOTES
REGIONAL ANESTHESIA PAIN SERVICE CONTINUOUS NERVE INFUSION NOTE  Patient is CD1 after R femoral fracture and placement of R fascia iliaca catheter for pain control.    SUBJECTIVE:  Interval History: Increased pain with movement. PRN oxy and dilaudid.    Antithrombotic/Thrombolytic Therapy ordered:  None    BP (!) 184/81 (BP Location: Left arm)   Pulse 88   Temp 36.2  C (97.1  F) (Oral)   Resp 16   Ht 1.524 m (5')   Wt 57.9 kg (127 lb 9.6 oz)   SpO2 96%   BMI 24.92 kg/m      Exam:   GEN: Sitting up in bed, NAD  SKIN: catheter site with dressing intact      ASSESSMENT/PLAN:    Patient is receiving analgesia with current multimodal therapy including R fascia iliaca catheter with infusion of Ropivacaine 0.2%  at 14mL/hr    - Bolus given: 0800 - 0.25% Bupivacaine, 10mL total  - continue Ropivacaine 0.2% infusion rate at 14mL/hr  - antithrombotic/thrombolytic therapy None ordered. Please contact RAPS (#[phone]) prior to any medication changes  - will continue to follow and adjust as needed    - discussed plan with attending anesthesiologist    Harris Montague MD  Regional Anesthesia Pain Service  05/31/2020    RAPS Contact Info (24 hour job code pager is the last 4 digits) For in-house use only:   [place] phone: [place] [phone], [place] [phone], [facility] [phone], then enter call-back number.    Text: Use [employer] on the Intranet <Paging/Directory> tab and enter Jobcode ID.   If no call back at any time, contact the hospital  and ask for RAPS attending or backup

## 2020-05-31 NOTE — PLAN OF CARE
Discharge Planner PT   Patient plan for discharge: not discussed  Current status: PT: Mandyal completed treat initiated. Pt supine, reporting more L LE pain and numbness than in R LE, although this changed throughout session. Pt needed significant encouragement but agreeable to trial sitting EOB. Pt needed dependent max Ax2 with drawsheet sup<>sit, sitting mod A mostly with brief min A a times. Unable to progress to scooting or standing. Dependent boost in bed after, pt positioned comfortably with all needs in reach.  Barriers to return to prior living situation: medical and functional status  Recommendations for discharge: TCU  Rationale for recommendations: Pt will need continued rehab to progress functional mobility       Entered by: Claudia Marcos 05/30/2020 10:51 PM

## 2020-05-31 NOTE — PLAN OF CARE
Time: 1900 - 0730  Reason for Admission: Right femoral neck fracture after a fall during a transfer  Vitals: BP (!) 162/75 (BP Location: Left arm)   Pulse 88   Temp 98.2  F (36.8  C) (Axillary)   Resp 18   Ht 1.524 m (5')   Wt 57.9 kg (127 lb 9.6 oz)   SpO2 96%   BMI 24.92 kg/m       Activity: Pt remained in bed. Repositioned Q2 as pt would allow d/t pain.  Pain: Increased pain with movement. PRN oxy given x 1 and dilaudid x 1. OnQ in place infusing at 14 mL/hr with some relief.   Neuro: Disoriented to place, time, and situation. Pleasantly confused and cooperative with cares. Denies numbness or tingling in LE. Educated pt on call light use.    Cardiac: Hypertensive - within parameters. Denies cardiac chest pain.  Respiratory: LS clear - slightly diminished in bases. On RA sating > 95%. Denies SOB.  GI/: Incontinent of urine - purewick in place and changed this shift. Hypoactive BS. No BM this shift.   Diet: Regular diet   Skin: Scattered bruising present - otherwise skin intact.  LDAs: (L) PIV infusing NS at 100 mL/hr. OnQ in (R) groin.  New change for this shift: None.  Plan: Continue with pain management and POC.

## 2020-06-01 ENCOUNTER — APPOINTMENT (OUTPATIENT)
Dept: PHYSICAL THERAPY | Facility: CLINIC | Age: 85
DRG: 536 | End: 2020-06-01
Payer: MEDICARE

## 2020-06-01 LAB
ANION GAP SERPL CALCULATED.3IONS-SCNC: 7 MMOL/L (ref 3–14)
BUN SERPL-MCNC: 13 MG/DL (ref 7–30)
CALCIUM SERPL-MCNC: 8 MG/DL (ref 8.5–10.1)
CHLORIDE SERPL-SCNC: 100 MMOL/L (ref 94–109)
CO2 SERPL-SCNC: 22 MMOL/L (ref 20–32)
CREAT SERPL-MCNC: 0.48 MG/DL (ref 0.52–1.04)
ERYTHROCYTE [DISTWIDTH] IN BLOOD BY AUTOMATED COUNT: 14.4 % (ref 10–15)
GFR SERPL CREATININE-BSD FRML MDRD: 83 ML/MIN/{1.73_M2}
GLUCOSE SERPL-MCNC: 98 MG/DL (ref 70–99)
HCT VFR BLD AUTO: 30.9 % (ref 35–47)
HGB BLD-MCNC: 10 G/DL (ref 11.7–15.7)
MCH RBC QN AUTO: 30.8 PG (ref 26.5–33)
MCHC RBC AUTO-ENTMCNC: 32.4 G/DL (ref 31.5–36.5)
MCV RBC AUTO: 95 FL (ref 78–100)
PLATELET # BLD AUTO: 310 10E9/L (ref 150–450)
PLATELET # BLD AUTO: 318 10E9/L (ref 150–450)
POTASSIUM SERPL-SCNC: 3.8 MMOL/L (ref 3.4–5.3)
RBC # BLD AUTO: 3.25 10E12/L (ref 3.8–5.2)
SODIUM SERPL-SCNC: 130 MMOL/L (ref 133–144)
WBC # BLD AUTO: 4.9 10E9/L (ref 4–11)

## 2020-06-01 PROCEDURE — 85049 AUTOMATED PLATELET COUNT: CPT | Performed by: SURGERY

## 2020-06-01 PROCEDURE — 25000132 ZZH RX MED GY IP 250 OP 250 PS 637: Mod: GY | Performed by: NURSE PRACTITIONER

## 2020-06-01 PROCEDURE — 12000001 ZZH R&B MED SURG/OB UMMC

## 2020-06-01 PROCEDURE — 25000128 H RX IP 250 OP 636: Performed by: SURGERY

## 2020-06-01 PROCEDURE — 99232 SBSQ HOSP IP/OBS MODERATE 35: CPT | Performed by: NURSE PRACTITIONER

## 2020-06-01 PROCEDURE — 36415 COLL VENOUS BLD VENIPUNCTURE: CPT | Performed by: STUDENT IN AN ORGANIZED HEALTH CARE EDUCATION/TRAINING PROGRAM

## 2020-06-01 PROCEDURE — 97530 THERAPEUTIC ACTIVITIES: CPT | Mod: GP

## 2020-06-01 PROCEDURE — 80048 BASIC METABOLIC PNL TOTAL CA: CPT | Performed by: STUDENT IN AN ORGANIZED HEALTH CARE EDUCATION/TRAINING PROGRAM

## 2020-06-01 PROCEDURE — 25000125 ZZHC RX 250

## 2020-06-01 PROCEDURE — 25000132 ZZH RX MED GY IP 250 OP 250 PS 637: Mod: GY | Performed by: SURGERY

## 2020-06-01 PROCEDURE — 40000275 ZZH STATISTIC RCP TIME EA 10 MIN

## 2020-06-01 PROCEDURE — 99232 SBSQ HOSP IP/OBS MODERATE 35: CPT | Performed by: INTERNAL MEDICINE

## 2020-06-01 PROCEDURE — 97110 THERAPEUTIC EXERCISES: CPT | Mod: GP

## 2020-06-01 PROCEDURE — 94660 CPAP INITIATION&MGMT: CPT

## 2020-06-01 PROCEDURE — 85027 COMPLETE CBC AUTOMATED: CPT | Performed by: STUDENT IN AN ORGANIZED HEALTH CARE EDUCATION/TRAINING PROGRAM

## 2020-06-01 PROCEDURE — 27110038 ZZH RX 271

## 2020-06-01 PROCEDURE — 36415 COLL VENOUS BLD VENIPUNCTURE: CPT | Performed by: SURGERY

## 2020-06-01 RX ORDER — LANOLIN ALCOHOL/MO/W.PET/CERES
3 CREAM (GRAM) TOPICAL AT BEDTIME
Status: DISCONTINUED | OUTPATIENT
Start: 2020-06-01 | End: 2020-06-04 | Stop reason: HOSPADM

## 2020-06-01 RX ADMIN — CALCIUM 500 MG: 500 TABLET ORAL at 07:51

## 2020-06-01 RX ADMIN — METHOCARBAMOL 750 MG: 750 TABLET, FILM COATED ORAL at 07:51

## 2020-06-01 RX ADMIN — THERA TABS 1 TABLET: TAB at 07:52

## 2020-06-01 RX ADMIN — MELATONIN 2000 UNITS: at 07:51

## 2020-06-01 RX ADMIN — METHOCARBAMOL 750 MG: 750 TABLET, FILM COATED ORAL at 13:53

## 2020-06-01 RX ADMIN — METHOCARBAMOL 750 MG: 750 TABLET, FILM COATED ORAL at 20:32

## 2020-06-01 RX ADMIN — ROPINIROLE HYDROCHLORIDE 1 MG: 1 TABLET, FILM COATED ORAL at 21:38

## 2020-06-01 RX ADMIN — OXYCODONE HYDROCHLORIDE 5 MG: 5 TABLET ORAL at 20:42

## 2020-06-01 RX ADMIN — ENOXAPARIN SODIUM 30 MG: 30 INJECTION SUBCUTANEOUS at 00:21

## 2020-06-01 RX ADMIN — AMLODIPINE BESYLATE 5 MG: 5 TABLET ORAL at 07:52

## 2020-06-01 RX ADMIN — CALCIUM 500 MG: 500 TABLET ORAL at 20:32

## 2020-06-01 RX ADMIN — LOSARTAN POTASSIUM 50 MG: 50 TABLET, FILM COATED ORAL at 07:52

## 2020-06-01 RX ADMIN — ACETAMINOPHEN 975 MG: 325 TABLET, FILM COATED ORAL at 20:32

## 2020-06-01 RX ADMIN — ACETAMINOPHEN 975 MG: 325 TABLET, FILM COATED ORAL at 13:53

## 2020-06-01 RX ADMIN — MELATONIN TAB 3 MG 3 MG: 3 TAB at 21:38

## 2020-06-01 RX ADMIN — ACETAMINOPHEN 975 MG: 325 TABLET, FILM COATED ORAL at 07:51

## 2020-06-01 RX ADMIN — OXYCODONE HYDROCHLORIDE 5 MG: 5 TABLET ORAL at 15:55

## 2020-06-01 RX ADMIN — Medication: at 08:44

## 2020-06-01 RX ADMIN — LEVOTHYROXINE SODIUM 75 MCG: 0.07 TABLET ORAL at 20:32

## 2020-06-01 RX ADMIN — ENOXAPARIN SODIUM 30 MG: 30 INJECTION SUBCUTANEOUS at 12:02

## 2020-06-01 RX ADMIN — Medication 0.2 MG: at 00:21

## 2020-06-01 ASSESSMENT — ACTIVITIES OF DAILY LIVING (ADL)
ADLS_ACUITY_SCORE: 34
ADLS_ACUITY_SCORE: 36

## 2020-06-01 NOTE — PROGRESS NOTES
05/30/20 1500   Quick Adds   Type of Visit Initial PT Evaluation   Living Environment   Lives With facility resident   Living Environment Comment Very limited subjective eval/history as pt is poor historian. Per notes pt living in LTC facility, with previous L hip fracture has been non-ambulatory and completing transfers only.   Self-Care   Usual Activity Tolerance fair   Current Activity Tolerance poor   Functional Level Prior   Ambulation 4-->completely dependent   Transferring 2-->assistive person   Toileting 2-->assistive person   Bathing 2-->assistive person   Cognition 1 - attention or memory deficits   Fall history within last six months yes   Number of times patient has fallen within last six months 3   Prior Functional Level Comment Very limited subjective eval/history as pt is poor historian. Per notes pt living in LTC facility, with previous L hip fracture has been non-ambulatory and completing transfers only.   General Information   Onset of Illness/Injury or Date of Surgery - Date 05/28/20   Referring Physician Farshad Garland MD   Pertinent History of Current Problem (include personal factors and/or comorbidities that impact the POC) 89 year old female who presented to our ED via EMS with hip pain. She fell today while transferring and landed on her right hip. She is not ambulatory due to an extensive history involving her left hip which ended with a Girdlestone procedure. Currently pain is in her hip and back only.   Precautions/Limitations fall precautions;other (see comments)   Weight-Bearing Status - RLE nonweight-bearing   General Info Comments Pt confused but pleasant, agreable with encouragement   Cognitive Status Examination   Orientation person   Level of Consciousness alert   Follows Commands and Answers Questions 25% of the time;able to follow single-step instructions   Personal Safety and Judgment impaired   Memory impaired   Posture    Posture Forward head  "position;Protracted shoulders   Range of Motion (ROM)   ROM Comment B LEs limited due to pain in both hips   Strength   Strength Comments B LEs decreasd due to pain, fracture   Bed Mobility   Bed Mobility Comments max to total A x2 to sit EOB   Transfer Skills   Transfer Comments Unable to assess   General Therapy Interventions   Planned Therapy Interventions bed mobility training;balance training;transfer training;stretching;ROM;risk factor education;home program guidelines;progressive activity/exercise   Clinical Impression   Criteria for Skilled Therapeutic Intervention yes, treatment indicated   PT Diagnosis impaired functional mobility   Influenced by the following impairments impaired strength, ROM, significant pain   Functional limitations due to impairments decreased I with transfers, bed mob, ADLs   Clinical Presentation Evolving/Changing   Clinical Decision Making (Complexity) Low complexity   Therapy Frequency 5x/week   Predicted Duration of Therapy Intervention (days/wks) 6/5   Anticipated Discharge Disposition Long Term Care Facility   Risk & Benefits of therapy have been explained Yes   Patient, Family & other staff in agreement with plan of care Yes   [facility] TM \"6 Clicks\"   2016, Trustees of [facility], under license to [employer].  All rights reserved.   6 Clicks Short Forms Basic Mobility Inpatient Short Form   [facility] AM-PAC  \"6 Clicks\" V.2 Basic Mobility Inpatient Short Form   1. Turning from your back to your side while in a flat bed without using bedrails? 1 - Total   2. Moving from lying on your back to sitting on the side of a flat bed without using bedrails? 1 - Total   3. Moving to and from a bed to a chair (including a wheelchair)? 1 - Total   4. Standing up from a chair using your arms (e.g., wheelchair, or bedside chair)? 1 - Total   5. To walk in hospital room? 1 - Total   6. Climbing 3-5 steps with a railing? 1 - Total   Basic Mobility Raw Score " (Score out of 24.Lower scores equate to lower levels of function) 6

## 2020-06-01 NOTE — PLAN OF CARE
BP (!) 166/65 (BP Location: Left arm)   Pulse 89   Temp 96.9  F (36.1  C) (Oral)   Resp 18   Ht 1.524 m (5')   Wt 57.9 kg (127 lb 9.6 oz)   SpO2 95%   BMI 24.92 kg/m      Neuro: Oriented to only self. Lethargic at times this morning.   Cardiac: Hypertensive but within parameters. Denies chest pain.  Respiratory: sats mid 90s on RA, denies SOB.  GI/: +BS, +passing flatus, no BM this shift. Incontinent of bowel/bladder. Purewick in place.   Diet: Regular, tolerating well  Skin: Warm, dry, intact. Mepilex on coccyx preventatively.   Lines: Left PIV- saline locked.   Pain/nausea: C/O R hip pain. Pain currently managed with ONQ pump at 14 mL/hr and scheduled tylenol and robaxin. Denies nausea.   Activity: Assist of 2 of repositioning. repositioned q2 hrs.   Plan: Continue to monitor and follow POC.

## 2020-06-01 NOTE — CONSULTS
REGIONAL ANESTHESIA PAIN SERVICE    CONSULT DATE: 5/29/2020  REASON FOR CONSULT: pain from right femoral neck fracture  ORDERING PROVIDER: Muriel Rocha CNP        Intervention:Right Fascia iliaca catheter placement on 5/30/2020.  Please see procedure note on 5/30/2020 from GAURI Dutton MD for details.     Plan:   Continue nerve block catheter with infusion Ropivacaine 0.2% at 14 mL/hr  RAPS will continue to follow and adjust as needed     Thank you for involving us in the care of Ms.       Lynsey Bates PA-C  Regional Anesthesia Pain Service (RAPS)       RAPS Contact Info (for in-house use only):  Job code ID: [id number]   [place] 0599  [facility] 0602  [place] phone: dial * * * [phone], enter jobcode ID, then enter call-back number.    Text: Use [employer] on the Intranet <Paging/Directory> tab and enter Jobcode ID.   If no call back at any time, contact the hospital  and ask for RAPS attending or backup

## 2020-06-01 NOTE — PLAN OF CARE
7B  Discharge Planner PT   Patient plan for discharge: did not state  Current status: pt dependent assist via sheet to reach EOB. Tolerates < 5 minutes at EOB 2/2 pain in low back and R hip. Minimal participation in BLE therex 2/2 pain.   Barriers to return to prior living situation: medical status, mobility status, weakness, pain  Recommendations for discharge: TCU  Rationale for recommendations: pt will benefit from ongoing rehab to improve mobility and strength.        Entered by: Teodoro García 06/01/2020 2:56 PM

## 2020-06-01 NOTE — PROGRESS NOTES
REGIONAL ANESTHESIA PAIN SERVICE CONTINUOUS NERVE INFUSION NOTE  Bharati Crane is a 89 year old female who is CD#2 of right fascia Iliaca nerve block for pain management due to right femoral neck fracture.     Subjective and Interval History: Overnight events: no acute event.  Patient seen at 0936, was very sleepy, returned later at noon and 3 pm.   Difficult to assess patient's pain due to cognition related to age.  Denies weakness, paresthesias, circumoral numbness, metallic taste or tinnitus. Patient is on bed rest, limited movements.  Is Tolerating a regular diet, denies nausea.    Pain Intensity using Numerical Rating Scale (NRS): defer, difficult to assess, pt unable to tell.      Antithrombotic/Thrombolytic Therapy ordered:  enoxaparin ANTICOAGULANT (LOVENOX) injection  30 mg, SC, Q12H       Analgesic Medications:   Medications related to Pain Management (From now, onward)    Start     Dose/Rate Route Frequency Ordered Stop    05/31/20 0843  sodium phosphate (FLEET ENEMA) 1 enema      1 enema Rectal ONCE PRN 05/31/20 0843      05/30/20 2000  senna-docusate (SENOKOT-S/PERICOLACE) 8.6-50 MG per tablet 2-3 tablet      2-3 tablet Oral 2 TIMES DAILY 05/30/20 0934      05/30/20 1200  ropivacaine 0.2% (NAROPIN) 750 mL in ON-Q C-Bloc select flow (QD1345 holds 600-750 mL) single cath disposable pump      14 mL/hr  Irrigation Elastomeric Pump 05/30/20 1145      05/30/20 1145  fentaNYL (PF) (SUBLIMAZE) injection 25-50 mcg      25-50 mcg Intravenous EVERY 2 MIN PRN 05/30/20 1146      05/30/20 1145  midazolam (VERSED) injection 1-2 mg      1-2 mg Intravenous EVERY 4 MIN PRN 05/30/20 1146      05/30/20 0945  polyethylene glycol (MIRALAX) Packet 17 g      17 g Oral DAILY 05/30/20 0934      05/29/20 1430  methocarbamol (ROBAXIN) tablet 750 mg      750 mg Oral 3 TIMES DAILY 05/29/20 1316      05/29/20 1316  oxyCODONE (ROXICODONE) tablet 5 mg      5 mg Oral EVERY 3 HOURS PRN 05/29/20 1316      05/29/20 1030  acetaminophen  (TYLENOL) tablet 975 mg      975 mg Oral 3 TIMES DAILY 05/29/20 0943      05/29/20 0943  lidocaine 1 % 1 mL      1 mL Other EVERY 1 HOUR PRN 05/29/20 0943      05/29/20 0943  lidocaine (LMX4) cream       Topical EVERY 1 HOUR PRN 05/29/20 0943      05/29/20 0943  bisacodyl (DULCOLAX) Suppository 10 mg      10 mg Rectal DAILY PRN 05/29/20 0943      05/29/20 0653  HYDROmorphone (DILAUDID) injection 0.2 mg      0.2 mg Intravenous EVERY 2 HOURS PRN 05/29/20 0653             Objective:  Lab results  Recent Labs   Lab Test 06/01/20  0614   WBC 4.9   RBC 3.25*   HGB 10.0*   HCT 30.9*   MCV 95   MCH 30.8   MCHC 32.4   RDW 14.4     318       No results found for: INR    Vitals:    Temp:  [96  F (35.6  C)-99.4  F (37.4  C)] 97.6  F (36.4  C)  Pulse:  [84-94] 84  Heart Rate:  [89] 89  Resp:  [18] 18  BP: (127-171)/(46-70) 156/62  SpO2:  [94 %-98 %] 96 %    Exam:   GEN: alert and no distress during the afternoon visit.   NEURO/MSK: Skin intact  Strength: able to wiggle toes.   SKIN: right fascia iliaca catheter site is with dressing c/d/i, no tenderness, erythema, heme, edema.      Assessment and Plan:     Patient is receiving adequate analgesia with current multimodal therapy including right fascia iliaca catheter with infusion of ropivacaine 0.2% at 14mL/hr.  Adequate sensory block.  No evidence of adverse side effects related to local anesthetic.     - continue Ropivacaine 0.2% infusion rate at 14 mL/hr,CD #2  - expected change of next On-Q pump is in 2 days.  - antithrombotic/thrombolytic therapy: continue enoxaparin ANTICOAGULANT (LOVENOX) injection  30 mg, SC, Q12H    as ordered. Please contact RAPS (#[phone]) prior to any medication changes  - will continue to follow and adjust as needed    - discussed plan with attending anesthesiologist, Dr. Mcintosh.    Lynsey Bates PA-C  [facility]ional Anesthesia Pain Service  6/1/2020 3:05 PM    RAPS Contact Info (24 hour job code pager is the last 4 digits) For in-house use only:    Job code ID: [id number]   [facility] 0599  Peds 0602  Rick phone: dial * * * [phone], enter jobcode ID, then enter call-back number.    Text: Use [employer] on the Intranet <Paging/Directory> tab and enter Jobcode ID.   If no call back at any time, contact the hospital  and ask for RAPS attending or backup

## 2020-06-01 NOTE — PROGRESS NOTES
[facility], [place]  Trauma Service Progress Note    Date of Service (when I saw the patient): 06/01/2020     Assessment & Plan     Trauma mechanism: Fall  Time/date of injury: 5/29/20 3am     Known Injuries:  Right femoral neck fracture    PMH:  Left hip Girdlestone procedure  Dementia    Neuro/Pain:  # Acute pain  - On exam today, she tells me she hurt her leg, at second visit denies pain  - scheduled tylenol and robaxin. PRN dilaudid and oxycodone (no narcotics used since MN)  - Right-sided fascia iliaca catheter placed 05/30 with Ropivacaine per anesthesia      # Dementia  # Anxiety  - Added Melatonin for sleep  - Monitor neurological status.   - Maintain circadian rhythm.  Lights on during the day.  Off at night, minimize cares at night.  OOB during the day.     Pulmonary:  # DONALD with CPAP use  - continue CPAP use  - Supplemental oxygen to keep saturation above 92 %.  - Aggressive pulmonary hygiene. Incentive spirometer while awake      Cardiovascular:    # HTN  - continue PTA Amlodipine     GI/Nutrition:    # Constipation  - OK for regular diet     Renal/ Fluids/Electrolytes:  # Hyponatremia, stable  - SL MIVF  - electrolyte replacement protocol  In place.   - Will recheck BMP tomorrow morning     Endocrine:  # Hypothyroidism  - continue PTA medications  - Goal to keep BG < 180 for optimal wound healing      Infectious disease:   # no acute issues  - WBC 5.8  - UA ordered, not collected yet  - No indications for antibiotics.      Hematology:    #Acute on chronic anemia, stable in the 10 range  - Threshold for transfusion if hgb <7.0 or signs/symptoms of hypoperfusion.        Musculoskeletal:  # Right femoral neck fracture  -Managing non operatively, given non weight bearing status at baseline, with multiple complications S/P left hip hardware failure resulting in a girdle stone procedure thus non weightbearing and advanced age.  - Focus on pain control.   - Orthopedic surgery is OK  with up in chair based on comfort.  - Physical and occupational therapy consults      Skin:  #  No acute issues  - dilgent cares to prevent skin breakdown and wound formation.   Frequent repositioning     Palliative care on board to assist with goals of care planning and pain control.    Lines/ tubes/ drains:  - PIV     Code status:  DNR/DNI     General Cares:               PPI/H2 blocker:  N/A              DVT prophylaxis: Resume lovenox on 5/31              Bowel Regimen/Date of last stool: senna and miralax daily. 06/01              Pulmonary toilet: IS              ETOH screen completed: yes      Discharge goals:     Adequate pain management: on-going    VSS x24 hours: on-going    Hemoglobin stable x 48 hours: yes    Ambulating safely and/or therapy evals complete: ordered    Drains/lines removed or plan in place to manage: yes    Teaching done: on-going     Expected D/C date: TBD, pending pain control  Interval History   Lying in bed with eyes closed, states, I think I hurt my leg.  ROS x 8 negative with exception of those things listed in interval hx    Physical Exam   Temp: 96.9  F (36.1  C) Temp src: Oral BP: (!) 166/65 Pulse: 89 Heart Rate: 89 Resp: 18 SpO2: 95 % O2 Device: None (Room air)    Vitals:    05/28/20 2025 05/30/20 1700   Weight: 60.5 kg (133 lb 6.4 oz) 57.9 kg (127 lb 9.6 oz)     Vital Signs with Ranges  Temp:  [96  F (35.6  C)-99.4  F (37.4  C)] 96.9  F (36.1  C)  Pulse:  [87-94] 89  Heart Rate:  [89] 89  Resp:  [18] 18  BP: (127-171)/(46-70) 166/65  SpO2:  [94 %-98 %] 95 %  I/O last 3 completed shifts:  In: 960 [P.O.:960]  Out: 1600 [Urine:1600]    Jayshree Coma Scale - Total 14/15  Eye Response (E): 4   4= spontaneous, 3= to verbal/voice, 2= to pain, 1= No response   Verbal Response (V): 4   5= Orientated, converses, 4= Confused, converses, 3= Inappropriate words, 2= Incomprehensible sounds, 1=No response   Motor Response (M): 6   6= Obeys commands, 5= Localizes to pain, 4= Withdrawal to pain,  3=Fexion to pain, 2= Extension to pain, 1= No response     Constitutional: Awake, alert, confused  Eyes: JEAN PIERRE, eyes closed  ENT: Normocephalic, atraumatic  Respiratory: No increased work of breathing, good air exchange, clear to auscultation bilaterally, no crackles or wheezing.  Cardiovascular:  regular rate and rhythm, normal S1 and S2, no S3 or S4, and no murmur.   GI: Normal bowel sounds, abdomen soft, non-distended, non-tender, no guarding  Genitourinary:  Incontinent at baseline  Skin:  Normal skin color, warm and dry, right hip with moderate edema  Musculoskeletal: Pain with movement or palpation of the right hip.  Neurologic: Awake, alert, confused, delirious  Neuropsychiatric: Restless    JULIOCESAR Kuhn CNP  To contact the trauma service use job code pager [phone],   Numeric texts or alpha text through [facility]

## 2020-06-01 NOTE — PLAN OF CARE
Activity: A2 of repositioning, repositioned as pt allowed.  Neuros: Oriented to self and sometimes situation, pleasantly confused.  Cardiac: WDL, HTN. Denies chest pain.  Respiratory: WDL, stable on RA, denies SOB.  GI/: Incontinent of bowel/bladder. Purewick in place. 3 soft bowel movements.  Diet: Regular, tolerating  Skin: Warm, dry, intact. New new deficits noted.  Lines: L PIV infusing, R PIV SL  Labs: Reviewed.  Pain/nausea:  Pain relieved with PRN oxycodone x2 and IV dilaudid x2. Pt also has scheduled tylenol and robaxin. On-Q @ 14 mL/hr in R groin. Pt denies nausea.  Plan: Continue to monitor and follow POC.

## 2020-06-01 NOTE — PROGRESS NOTES
Perioperative Pain Service Cross Cover Note    Patient seen and evaluated prior to scheduled bolus of fascia iliaca catheter.     /46 (BP Location: Left arm)   Pulse 87   Temp 35.6  C (96  F) (Oral)   Resp 18   Ht 1.524 m (5')   Wt 57.9 kg (127 lb 9.6 oz)   SpO2 96%   BMI 24.92 kg/m      Fascia iliaca catheter bolused with PF bupivacaine 0.25%, 6 mL catheter incrementally with negative aspirate before and between each mL without complication, no symptoms of local anesthetic toxicity (LAST).  Remained with and assessed patient for 10 min post-injection.  Bedside nurse aware she should continue monitor and assess for any untoward effects to local anesthetic following injection and contact anesthesia for any questions or concerns.    Harris Montague MD  Anesthesiology Resident   [phone]    5/31/2020 8:10 PM

## 2020-06-01 NOTE — PLAN OF CARE
Time: 1900 - 0730  Reason for Admission: Right femoral neck fracture after a fall during a transfer  Vitals: BP (!) 156/62 (BP Location: Left arm)   Pulse 89   Temp 98.1  F (36.7  C) (Axillary)   Resp 18   Ht 1.524 m (5')   Wt 57.9 kg (127 lb 9.6 oz)   SpO2 98%   BMI 24.92 kg/m       Activity: Pt remained in bed. Repositioned Q2 as pt would allow d/t pain.  Pain: Increased pain with movement. PRN oxy given x 1 and dilaudid x 1. OnQ epidural in place infusing 14 mL/hr. Anesthesia gave pt bolus at start of shift.   Neuro: Disoriented to time and situation - intermittently disoriented to place. Tearful at times regarding confusion, but cooperative with cares. Denies any numbness or tingling in LE.   Cardiac: Hypertensive - within parameters. Denies cardiac chest pain.  Respiratory: LS clear - slightly diminished in bases. On RA. Denies SOB.   GI/: Incontinent of urine and stool. Purewick in place. Hypoactive BS. No BM this shift.   Diet: Regular diet  Skin: Scattered bruising present - otherwise skin intact.  LDAs: (L) PIV removed this shift. (R) PIV infusing NS at 100 mL/hr. OnQ epidural intact.  New change for this shift: None.  Plan: Continue with pain management and POC.

## 2020-06-01 NOTE — PROGRESS NOTES
[facility]  Palliative Care Daily Progress Note       Recommendations & Counseling       Will continue to try to reach family to discuss ongoing goals of care and planning (attempted but was unable to reach daughters by phone today, will try again tomorrow)    Should complete a POLST form prior to discharge    Pain control is adequate today with current right fascia iliac nerve block thought his will not be a long term solution. Our team is happy to follow and assist with pain control if needed over time as nerve block is weaned off.       Total time spent was 30 minutes,  >50% of time was spent counseling and/or coordination of care regarding symptom assessment, LUCY with primary team regarding goals and dispo planning, and attempted calls to family.    Lilia Jimenez MD / Palliative Medicine / Pager [phone] / [facility] Inpatient Team Consult Pager [phone] (answered 8am-430pm M-F) - ok to text page via [employer] / After-Hours Answering Service [phone] / Palliative Clinic in the [facility] at the [facility] - [phone] (scheduling); [phone] (triage).          Assessments          Bharati Crane is a 89 year old female with a past medical history significant dementia, obstructive sleep apnea, osteoporosis, hypertension, previous left femoral neck fracture status post Girdlestone, now nonambulatory who present to the ER with hip pain after a fall during a transfer and workup revealed a right femoral neck fracture.      Today, the patient was seen for:  Femoral neck fracture  Pain due to above    Prognosis, Goals, or Advance Care Planning was addressed today with: No.  Mood, coping, and/or meaning in the context of serious illness were addressed today: No.  Summary/Comments:            Interval History:     Chart review/discussion with unit or clinical team members:   Started nerve block over weekend on Saturday. She did use opioids over weekend but now  has had no use since midnight    Per patient or family/caregivers today:  Poor historian due to dementia however she appears calm and comfortable and denies pain and does understand that she fell and hurt her hip. With very minimal turning/movement she reports some pain but also seems afraid that there will be pain- has anticipatory withdraw and grimacing    Key Palliative Symptoms:  We are not managing pain in this patient  We are not managing dyspnea in this patient  We are not managing nausea in this patient  We are not managing anxiety in this patient               Review of Systems:     Besides above, an additional system ROS was reviewed and is unremarkable          Medications:     I have reviewed this patient's medication profile and medications during this hospitalization.    Noted meds:    Scheduled tylenol tid  Melatonin 3mg at bedtime  Methocarbamol 750mg tid  miralax daily  Ropinirole 1mg at bedtime  Senna 2-3 tabs bid    PRN:  Bisacodyl supp  Fentanyl IV  Hydromorphone IV 0.2mg-- 0.6mg per day last 2 days but none since MN  Ondansetron 4mg  Oxycodone 5mg q 3 hours prn --15mg per day last 2 days but none since before MN today           Physical Exam:   Vitals were reviewed  Temp: 97.3  F (36.3  C) Temp src: Oral BP: (!) 173/70 Pulse: 84 Heart Rate: 93 Resp: 20 SpO2: 96 % O2 Device: None (Room air)    Gen: sitting/lying in bed. Appears comfortable  HEENT: NCAT. Conjunctiva clear. Sclera anicteric .  CV: RRR , Peripheral perfusion intact.   Resp: unlabored work of breathing,   Abd: soft, nt, nd, +BS   Msk: nerve block catheter noted at right thigh; mild pain with palpation of right hip and with flexion of hip and with brief attempt at turns  Skin:  no jaundice  Ext: warm, well perfused.   Neuro: face symmetric. EOM, vision, hearing grossly intact. Speech fluent. Moves all extremities   Mental status/Psych: alert. Orientated to general place (hospital) and reason (fell and hurt her hip) but unable to  participate in deeper conversation about her condition and not aware that she is not expected to walk again             Data Reviewed:     Reviewed recent pertinent imaging, comments:   5/28 xray hip/pelvis  IMPRESSION: There is a displaced right femoral neck fracture. 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 Deformity of the right pubic rami appears to be old.    Reviewed recent labs, comments:   GFR>90, cre 0.45

## 2020-06-01 NOTE — PLAN OF CARE
OT/7B: HOLD- Per discussion with PT, pt not yet able to tolerate two therapy disciplines. Will check back 6/3 to determine if OT eval indicated at that time.

## 2020-06-02 ENCOUNTER — APPOINTMENT (OUTPATIENT)
Dept: PHYSICAL THERAPY | Facility: CLINIC | Age: 85
DRG: 536 | End: 2020-06-02
Payer: MEDICARE

## 2020-06-02 ENCOUNTER — APPOINTMENT (OUTPATIENT)
Dept: SPEECH THERAPY | Facility: CLINIC | Age: 85
DRG: 536 | End: 2020-06-02
Attending: STUDENT IN AN ORGANIZED HEALTH CARE EDUCATION/TRAINING PROGRAM
Payer: MEDICARE

## 2020-06-02 PROCEDURE — 25000132 ZZH RX MED GY IP 250 OP 250 PS 637: Mod: GY | Performed by: NURSE PRACTITIONER

## 2020-06-02 PROCEDURE — 99232 SBSQ HOSP IP/OBS MODERATE 35: CPT | Performed by: NURSE PRACTITIONER

## 2020-06-02 PROCEDURE — 12000001 ZZH R&B MED SURG/OB UMMC

## 2020-06-02 PROCEDURE — 25000132 ZZH RX MED GY IP 250 OP 250 PS 637: Mod: GY | Performed by: INTERNAL MEDICINE

## 2020-06-02 PROCEDURE — 25000125 ZZHC RX 250: Performed by: NURSE PRACTITIONER

## 2020-06-02 PROCEDURE — 94660 CPAP INITIATION&MGMT: CPT

## 2020-06-02 PROCEDURE — 25000132 ZZH RX MED GY IP 250 OP 250 PS 637: Mod: GY | Performed by: SURGERY

## 2020-06-02 PROCEDURE — 40000275 ZZH STATISTIC RCP TIME EA 10 MIN

## 2020-06-02 PROCEDURE — 97530 THERAPEUTIC ACTIVITIES: CPT | Mod: GP

## 2020-06-02 PROCEDURE — 92610 EVALUATE SWALLOWING FUNCTION: CPT | Mod: GN

## 2020-06-02 PROCEDURE — 25000128 H RX IP 250 OP 636: Performed by: SURGERY

## 2020-06-02 PROCEDURE — 99232 SBSQ HOSP IP/OBS MODERATE 35: CPT | Performed by: INTERNAL MEDICINE

## 2020-06-02 RX ORDER — AMLODIPINE BESYLATE 2.5 MG/1
2.5 TABLET ORAL ONCE
Status: COMPLETED | OUTPATIENT
Start: 2020-06-02 | End: 2020-06-02

## 2020-06-02 RX ORDER — BUPIVACAINE HYDROCHLORIDE 5 MG/ML
3 INJECTION, SOLUTION EPIDURAL; INTRACAUDAL ONCE
Status: COMPLETED | OUTPATIENT
Start: 2020-06-02 | End: 2020-06-02

## 2020-06-02 RX ADMIN — AMLODIPINE BESYLATE 2.5 MG: 2.5 TABLET ORAL at 10:57

## 2020-06-02 RX ADMIN — MELATONIN TAB 3 MG 3 MG: 3 TAB at 20:22

## 2020-06-02 RX ADMIN — SENNOSIDES, DOCUSATE SODIUM 2 TABLET: 8.6; 5 TABLET ORAL at 09:22

## 2020-06-02 RX ADMIN — DICLOFENAC 4 G: 10 GEL TOPICAL at 18:19

## 2020-06-02 RX ADMIN — ENOXAPARIN SODIUM 30 MG: 30 INJECTION SUBCUTANEOUS at 00:42

## 2020-06-02 RX ADMIN — METHOCARBAMOL 750 MG: 750 TABLET, FILM COATED ORAL at 09:23

## 2020-06-02 RX ADMIN — METHOCARBAMOL 750 MG: 750 TABLET, FILM COATED ORAL at 13:55

## 2020-06-02 RX ADMIN — ROPINIROLE HYDROCHLORIDE 1 MG: 1 TABLET, FILM COATED ORAL at 20:21

## 2020-06-02 RX ADMIN — ACETAMINOPHEN 975 MG: 325 TABLET, FILM COATED ORAL at 13:55

## 2020-06-02 RX ADMIN — DICLOFENAC 4 G: 10 GEL TOPICAL at 20:21

## 2020-06-02 RX ADMIN — CALCIUM 500 MG: 500 TABLET ORAL at 09:24

## 2020-06-02 RX ADMIN — POLYETHYLENE GLYCOL 3350 17 G: 17 POWDER, FOR SOLUTION ORAL at 09:22

## 2020-06-02 RX ADMIN — OXYCODONE HYDROCHLORIDE 5 MG: 5 TABLET ORAL at 10:59

## 2020-06-02 RX ADMIN — ENOXAPARIN SODIUM 30 MG: 30 INJECTION SUBCUTANEOUS at 11:01

## 2020-06-02 RX ADMIN — MENTHOL 1 PATCH: 205.5 PATCH TOPICAL at 18:19

## 2020-06-02 RX ADMIN — ENOXAPARIN SODIUM 30 MG: 30 INJECTION SUBCUTANEOUS at 22:24

## 2020-06-02 RX ADMIN — CALCIUM 500 MG: 500 TABLET ORAL at 20:21

## 2020-06-02 RX ADMIN — LOSARTAN POTASSIUM 50 MG: 50 TABLET, FILM COATED ORAL at 09:23

## 2020-06-02 RX ADMIN — METHOCARBAMOL 750 MG: 750 TABLET, FILM COATED ORAL at 20:21

## 2020-06-02 RX ADMIN — ACETAMINOPHEN 975 MG: 325 TABLET, FILM COATED ORAL at 20:21

## 2020-06-02 RX ADMIN — LEVOTHYROXINE SODIUM 75 MCG: 0.07 TABLET ORAL at 20:21

## 2020-06-02 RX ADMIN — MELATONIN 2000 UNITS: at 09:23

## 2020-06-02 RX ADMIN — AMLODIPINE BESYLATE 5 MG: 5 TABLET ORAL at 09:23

## 2020-06-02 RX ADMIN — BUPIVACAINE HYDROCHLORIDE 15 MG: 5 INJECTION, SOLUTION EPIDURAL; INTRACAUDAL at 08:12

## 2020-06-02 RX ADMIN — ACETAMINOPHEN 975 MG: 325 TABLET, FILM COATED ORAL at 09:23

## 2020-06-02 RX ADMIN — THERA TABS 1 TABLET: TAB at 09:24

## 2020-06-02 RX ADMIN — SENNOSIDES, DOCUSATE SODIUM 2 TABLET: 8.6; 5 TABLET ORAL at 20:21

## 2020-06-02 ASSESSMENT — ACTIVITIES OF DAILY LIVING (ADL)
ADLS_ACUITY_SCORE: 34
ADLS_ACUITY_SCORE: 32
ADLS_ACUITY_SCORE: 36
ADLS_ACUITY_SCORE: 36
ADLS_ACUITY_SCORE: 34
ADLS_ACUITY_SCORE: 36

## 2020-06-02 NOTE — PROGRESS NOTES
[facility], [place]  Trauma Service Progress Note    Date of Service (when I saw the patient): 06/02/2020     Assessment & Plan   Trauma mechanism: Fall  Time/date of injury: 5/29/20 3am     Known Injuries:  Right femoral neck fracture     PMH:  Left hip Girdlestone procedure  Dementia     Neuro/Pain:  # Acute pain  - On exam today, she tells me she hurt her leg, at second visit denies pain  - scheduled tylenol and robaxin.  - Oxycodone available last dose 005/31  - Right-sided fascia iliaca catheter placed 05/30 with Ropivacaine per anesthesia   Rate decreased today, plan to clamp tomorrow, if pain level tolerable then discontinue catheter tomorrow.  - Will stop IV Hydromorphone today  - Will discuss other pain control options with Palliative today.     # Dementia  # Anxiety  - Added Melatonin for sleep  - Monitor neurological status.   - Maintain circadian rhythm.  Lights on during the day.  Off at night, minimize cares at night.  OOB during the day.     Pulmonary:  # DONALD with CPAP use  - continue to encourage CPAP use  - Supplemental oxygen to keep saturation above 92 %.  - Cough and deep breath     Cardiovascular:    # HTN  - continue PTA Amlodipine     GI/Nutrition:    # Constipation  - OK for regular diet  - Aspiration precautions, upright and alert for all oral intake     Renal/ Fluids/Electrolytes:  # Hyponatremia, stable  - electrolyte replacement protocol  In place.   - Will recheck BMP tomorrow morning     Endocrine:  # Hypothyroidism  - continue PTA medications  - Goal to keep BG < 180 for optimal wound healing      Infectious disease:   - No indications for antibiotics.      Hematology:    #Acute on chronic anemia, stable in the 10 range  - Threshold for transfusion if hgb <7.0 or signs/symptoms of hypoperfusion.        Musculoskeletal:  # Right femoral neck fracture  -Managing non operatively, given non weight bearing status at baseline, with multiple complications S/P left  hip hardware failure resulting in a girdle stone procedure thus non weightbearing and advanced age.  - Focus on pain control.   - Orthopedic surgery is OK with up in chair based on comfort.  - Physical and occupational therapy consults      Skin:  #  No acute issues  - dilgent cares to prevent skin breakdown and wound formation.   Frequent repositioning      Palliative care on board to assist with goals of care planning and pain control.  - Daughter Princess was updated today @ 1030am regarding pain control and anticipated discharge date  Lines/ tubes/ drains:  - PIV     Code status:  DNR/DNI     General Cares:               PPI/H2 blocker:  N/A              DVT prophylaxis: Enoxaparin SQ              Bowel Regimen/Date of last stool: senna and miralax daily. 06/01              Pulmonary toilet: IS              ETOH screen completed: yes      Discharge goals:     Adequate pain management: on-going    VSS x24 hours: yes    Hemoglobin stable x 48 hours: yes    Ambulating safely and/or therapy evals complete: ordered    Drains/lines removed or plan in place to manage: yes    Teaching done: on-going     Expected D/C date: Anticipate Thursday vs Friday to LTC.    Interval History   Refused CPAP overnight, state her hip hurts but pain is manageable. Denies shortness of breath or abdominal pain. Daughter states she called her children and grandchildren last night stating she will not make it through the night.    ROS x 8 negative with exception of those things listed in interval hx    Physical Exam   Temp: 97.5  F (36.4  C) Temp src: Oral BP: (!) 194/85 Pulse: 89 Heart Rate: 85 Resp: 18 SpO2: 95 % O2 Device: None (Room air)    Vitals:    05/28/20 2025 05/30/20 1700   Weight: 60.5 kg (133 lb 6.4 oz) 57.9 kg (127 lb 9.6 oz)     Vital Signs with Ranges  Temp:  [96.7  F (35.9  C)-98  F (36.7  C)] 97.5  F (36.4  C)  Pulse:  [84-89] 89  Heart Rate:  [84-93] 85  Resp:  [18-20] 18  BP: (156-194)/(62-85) 194/85  SpO2:  [93 %-99 %] 95  %  I/O last 3 completed shifts:  In: 480 [P.O.:480]  Out: 2050 [Urine:2050]    Jayshree Coma Scale - Total 14/15  Eye Response (E): 4   4= spontaneous, 3= to verbal/voice, 2= to pain, 1= No response   Verbal Response (V): 4   5= Orientated, converses, 4= Confused, converses, 3= Inappropriate words, 2= Incomprehensible sounds, 1=No response   Motor Response (M): 6   6= Obeys commands, 5= Localizes to pain, 4= Withdrawal to pain, 3=Fexion to pain, 2= Extension to pain, 1= No response     Constitutional: Awake, alert, cooperative, no apparent distress.  ENT: Normocephalic, atraumatic  Respiratory: No increased work of breathing, good air exchange, clear to auscultation bilaterally, no crackles or wheezing.  Cardiovascular:  regular rate and rhythm, normal S1 and S2, no S3 or S4, and no murmur.   GI: Normal bowel sounds, abdomen soft, non-distended, non-tender, no guarding  Genitourinary:  Clear yellow  Skin:  Normal skin color, warm and dry.  Musculoskeletal: There is no redness, warmth, or swelling of the joints.  Pedal pulse palpated.  Neurologic: Awake, alert, oriented to person and place  No focal deficits.  Neuropsychiatric: Calm, normal eye contact, alert,    [place] JULIOCESAR Schmidt CNP  To contact the trauma service use job code pager [phone],   Numeric texts or alpha text through [facility]

## 2020-06-02 NOTE — PROGRESS NOTES
[facility]  Palliative Care Daily Progress Note       Recommendations & Counseling       Will plan to call family to discuss ongoing goals of care and planning     Added icy hot patch and scheduled diclofenac gel for topical pain management strategies    Pain control is adequate today with current right fascia iliac nerve block though his will not be a long term solution and anticipate needing more aggressive oral pain medication management once it is weaned off. Our team is happy to follow and assist with pain control if needed over time as nerve block is weaned off. She may be a good candidate for low dose methadone as a long acting option that can be given in low geriatric doses but will reassess after the nerve block is stopped.       Total time spent was 30 minutes,  >50% of time was spent counseling and/or coordination of care regarding symptom assessment, LUCY with primary team regarding goals and dispo planning, and attempted calls to family.    Lilia Jimenez MD / Palliative Medicine / Pager [phone] / [facility] Inpatient Team Consult Pager [phone] (answered 8am-430pm M-F) - ok to text page via [employer] / After-Hours Answering Service [phone] / Palliative Clinic in the [facility] at the [facility] - [phone] (scheduling); [phone] (triage).          Assessments          Bharati Crane is a 89 year old female with a past medical history significant dementia, obstructive sleep apnea, osteoporosis, hypertension, previous left femoral neck fracture status post Girdlestone, now nonambulatory who present to the ER with hip pain after a fall during a transfer and workup revealed a right femoral neck fracture.      Today, the patient was seen for:  Femoral neck fracture  Pain due to above    Prognosis, Goals, or Advance Care Planning was addressed today with: No.  Mood, coping, and/or meaning in the context of serious illness were addressed today:  No.  Summary/Comments:            Interval History:     Chart review/discussion with unit or clinical team members:   Started nerve block over weekend on Saturday.   Per RN has fluctuating mental status, at times is restless and uncomfortable, needed one dose of prn oxycodone today    Per patient or family/caregivers today:  Poor historian due to dementia --denies pain at rest but does have discomfort in hip with repositioning    Key Palliative Symptoms:  We are not managing pain in this patient  We are not managing dyspnea in this patient  We are not managing nausea in this patient  We are not managing anxiety in this patient               Review of Systems:     Besides above, an additional system ROS was reviewed and is unremarkable          Medications:     I have reviewed this patient's medication profile and medications during this hospitalization.    Noted meds:    Scheduled tylenol tid  Melatonin 3mg at bedtime  Methocarbamol 750mg tid  miralax daily  Ropinirole 1mg at bedtime  Senna 2-3 tabs bid    PRN:  Bisacodyl supp  Hydromorphone IV 0.2mg  Ondansetron 4mg  Oxycodone 5mg q 3 hours prn            Physical Exam:   Vitals were reviewed  Temp: 96.4  F (35.8  C) Temp src: Oral BP: (!) 159/61 Pulse: 85 Heart Rate: 85 Resp: 16 SpO2: 95 % O2 Device: None (Room air)    Gen: sitting/lying in bed. Appears comfortable  HEENT: NCAT. Conjunctiva clear. Sclera anicteric .  CV: RRR , Peripheral perfusion intact.   Resp: unlabored work of breathing,   Abd: soft, nt, nd, +BS   Msk: nerve block catheter noted at right thigh; mild pain with palpation of right hip and with flexion of hip and with brief attempt at turns  Skin:  no jaundice  Ext: warm, well perfused.   Neuro: face symmetric. EOM, vision, hearing grossly intact. Speech fluent. Moves all extremities   Mental status/Psych: alert. Orientated to general place (hospital) and reason (fell and hurt her hip) but unable to participate in deeper conversation about her  condition              Data Reviewed:     Reviewed recent pertinent imaging, comments:   5/28 xray hip/pelvis  IMPRESSION: There is a displaced right femoral neck fracture. 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 Deformity of the right pubic rami appears to be old.    Reviewed recent labs, comments:   GFR>90, cre 0.45

## 2020-06-02 NOTE — PLAN OF CARE
Activity: Turned in bed q2H and per request.  Neuro: Oriented only to self.  Unable to effectively make needs known.  GI/: Adequate UOP via pur wick.  No BM this shift, does endorse flatus.  Diet: Regular, some gurgling after drinking thin liquids, SLP consult requested.  Incisions/Drains: ON Q on right thigh at 14mL/hr.  IV Access: PIV on left hand saline locked.  Labs: none this shift.  Vitals: WNLx BP; HTN at baseline.  Pain: Medicated with oxycodone, tylenol and robaxin per MAR, moderately effective.  ON-Q at 14mL/hr.  New changes this shift: n/a  Plan: Continue POC, pain management.

## 2020-06-02 NOTE — PLAN OF CARE
Discharge Planner PT   Patient plan for discharge: unsure, feels afraid to leave the hospital  Current status: Tolerated activity slightly better today from pain standpoint, but mobility is still quite limited.  Maximal assist x2 for supine<>sit.  Able to sit x7min at EOB with moderate assist x1-2.  Recommend recliner chair and liko lift up to chair for OOB.  Barriers to return to prior living situation: 2 person assist for mobility and ADL, poor tolerance for sitting  Recommendations for discharge: TCU  Rationale for recommendations: Pt is below baseline for mobility therefore TCU indicated to achieve modified independent or at least 1 person assist for mobility.  If pt were to return to her LTC in her current state, the facility would need both a jasson lift and the staffing capabilities to use the jasson.       Entered by: Tiarra Davis 06/02/2020 4:17 PM

## 2020-06-02 NOTE — PLAN OF CARE
Vitals:    06/02/20 0018 06/02/20 0354 06/02/20 0802 06/02/20 1503   BP: (!) 166/62 (!) 160/72 (!) 194/85 (!) 159/61   BP Location: Left arm Left arm Left arm Left arm   Pulse: 85  89 85   Resp: 18  18 16   Temp: 98  F (36.7  C) 96.7  F (35.9  C) 97.5  F (36.4  C) 96.4  F (35.8  C)   TempSrc: Oral Oral Oral Oral   SpO2: 99% 93% 95% 95%   Weight:       Height:         Pt is intermittently confused  easily redirectable.  High B/P noted  extra dose of Norvasc  given  B/P decreased  Pain well managed with oral agents and ice pack.   CMS intact. Pt remove PIV accidentally .   Good appetite.   Worked with PT (see notes).    Good urine output via external catheter. No Bm Passing gas.   Continue to follow up per plan of care.

## 2020-06-02 NOTE — PLAN OF CARE
BP (!) 166/62 (BP Location: Left arm)   Pulse 85   Temp 98  F (36.7  C) (Oral)   Resp 18   Ht 1.524 m (5')   Wt 57.9 kg (127 lb 9.6 oz)   SpO2 99%   BMI 24.92 kg/m      Reason for admission: R femur fracture  Neuro: oriented to self only, bed alarm on for safety, refused CPAP last night-attempted x2  Cardiac: ex HTN, no chest pain  Respiratory: ex diminished, no SOB  GI/: voiding using purewick, LBM: 5/31  Skin: mepilex on coccyx  Pain: stated she was in pain-got oxycodone but then refused, repositioned for comfort  Lines: L PIV-SL, On-Q on hip running @ 14  Drains: X  Incisions: X  Activity: assist x2 for repositioning, unable to bend RLE  Diet: Regular   Labs: reviewed  Changes/Plan: continue POC

## 2020-06-02 NOTE — PROGRESS NOTES
"REGIONAL ANESTHESIA PAIN SERVICE CONTINUOUS NERVE INFUSION NOTE  Bharatibenjamin Crane is a 89 year old female with previous history of left femoral neck fracture s/p girdlestone,  right femoral neck fracture who is now CD #3 right fascia iliaca nerve block catheter placed on 5/30 for pain managment    Subjective and Interval History: Overnight confused, refused CPAP and oxycodone.  Patient seen at 0800, awake and answers questions appropriately.  Bharati reports severe right hip pain that is \"burning and grinding in right buttock\".  She has continuous infusion at 14 mL/hr and current analgesics (see below).  Reports RLE weakness, denies paresthesias, circumoral numbness, metallic taste or tinnitus. Patient has not been OOB.  Passed swallow evaluation and is tolerating regular diet, denies nausea.      Antithrombotic/Thrombolytic Therapy ordered:   enoxaparin ANTICOAGULANT (LOVENOX) injection 30 mg, SC, Q12H         Analgesic Medications:   Medications related to Pain Management (From now, onward)    Start     Dose/Rate Route Frequency Ordered Stop    06/02/20 1600  ropivacaine 0.2% (NAROPIN) 750 mL in ON-Q C-Bloc select flow (PF3086 holds 600-750 mL) single cath disposable pump      10 mL/hr  Irrigation Elastomeric Pump 06/02/20 0811      05/31/20 0843  sodium phosphate (FLEET ENEMA) 1 enema      1 enema Rectal ONCE PRN 05/31/20 0843      05/30/20 2000  senna-docusate (SENOKOT-S/PERICOLACE) 8.6-50 MG per tablet 2-3 tablet      2-3 tablet Oral 2 TIMES DAILY 05/30/20 0934      05/30/20 1145  fentaNYL (PF) (SUBLIMAZE) injection 25-50 mcg      25-50 mcg Intravenous EVERY 2 MIN PRN 05/30/20 1146      05/30/20 1145  midazolam (VERSED) injection 1-2 mg      1-2 mg Intravenous EVERY 4 MIN PRN 05/30/20 1146      05/30/20 0945  polyethylene glycol (MIRALAX) Packet 17 g      17 g Oral DAILY 05/30/20 0934      05/29/20 1430  methocarbamol (ROBAXIN) tablet 750 mg      750 mg Oral 3 TIMES DAILY 05/29/20 1316      05/29/20 1316  oxyCODONE " (ROXICODONE) tablet 5 mg      5 mg Oral EVERY 3 HOURS PRN 05/29/20 1316      05/29/20 1030  acetaminophen (TYLENOL) tablet 975 mg      975 mg Oral 3 TIMES DAILY 05/29/20 0943      05/29/20 0943  lidocaine 1 % 1 mL      1 mL Other EVERY 1 HOUR PRN 05/29/20 0943      05/29/20 0943  lidocaine (LMX4) cream       Topical EVERY 1 HOUR PRN 05/29/20 0943      05/29/20 0943  bisacodyl (DULCOLAX) Suppository 10 mg      10 mg Rectal DAILY PRN 05/29/20 0943      05/29/20 0653  HYDROmorphone (DILAUDID) injection 0.2 mg      0.2 mg Intravenous EVERY 2 HOURS PRN 05/29/20 0653             Objective:  Lab results  Recent Labs   Lab Test 06/01/20  0614   WBC 4.9   RBC 3.25*   HGB 10.0*   HCT 30.9*   MCV 95   MCH 30.8   MCHC 32.4   RDW 14.4     318       No results found for: INR    Vitals:    Temp:  [96.7  F (35.9  C)-98  F (36.7  C)] 97.5  F (36.4  C)  Pulse:  [84-89] 89  Heart Rate:  [84-93] 85  Resp:  [18-20] 18  BP: (156-194)/(62-85) 194/85  SpO2:  [93 %-99 %] 95 %    Exam:   GEN: alert and moderate distress  NEURO/MSK: Extent of sensory blockade, loss of sensation  R) L3-L4.  Strength RLE wiggles toes, unable to flex knee and raise leg off bed  SKIN: right fascia iliaca catheter site with dressing c/d/i, no tenderness, erythema, heme, edema      Assessment and Plan:     Patient is receiving suboptimal analgesia with current multimodal therapy including right fascia iliaca catheter with infusion of ROpivacaine 0.2% at 14 mL/hr.  Motor function and adequate sensory block as expected.  No evidence of adverse side effects related to local anesthetic.     0803 Cinician administered nerve block bolus PF BUPivacaine 0.25%, total bolus 6 mL via right catheter, administered without complication, negative aspirate before and between each mL.  No symptoms of local anesthetic systemic toxicity (LAST). Remained with and assessed patient for 10 min post-injection. BP, P, and MAP stable.  Bedside RN aware of need to continue to  monitoring and document BP, P, and MAP Q 10 min for an additional 20 min. Contact RAPS (jobcode ID [id number]) if any of the following: patient experiencing any untoward effects, SBP < 90, P < 50 or > 120, MAP < 60     -  ROpivacaine 0.2% nerve block infusion rate decreased to 10 mL/hr   - expected change of next On-Q pump is 2 days  - antithrombotic/thrombolytic therapy okay to continue  Enoxaparin (Lovenox) SQ as ordered. Please contact RAPS (#[phone]) prior to any medication changes    1000 Follow up: Patient reports pain better, still hurts to move, received last dose of oxycodone yesterday at 2042. Encourage patient to use oxycodone PRN for pain  - patient can be evaluated to receive local anesthetic bolus Q 12 hr PRN, bedside nurse must page RAPS (#[phone]) to request bolus  - Discussed with Trauma Service, patient may discharge on Thursday so will stop nerve block infusion tomorrow (6/3/20) AM and if pain well controlled with oral analgesics will remove catheter at least 8 hrs after last dose enoxparin  - will continue to follow and adjust as needed    - discussed plan with attending anesthesiologist    JULIOCESAR Andrews [facility]  Regional Anesthesia Pain Service  6/2/2020 8:15 AM    RAPS Contact Info (24 hour job code pager is the last 4 digits) For in-house use only:   Job code ID: [id number]   [facility] 0599  Peds [phone]  [employer] phone: dial * * * [phone], enter jobcode ID, then enter call-back number.    Text: Use Secpanel on the Intranet <Paging/Directory> tab and enter Jobcode ID.   If no call back at any time, contact the hospital  and ask for RAPS attending or backup

## 2020-06-02 NOTE — PROGRESS NOTES
06/02/20 0829   General Information   Onset Date 05/28/20   Start of Care Date 06/02/20   Referring Physician Mojgan Bazan MD    Patient Profile Review/OT: Additional Occupational Profile Info See Profile for full history and prior level of function   Patient/Family Goals Statement Manage pain    Swallowing Evaluation Bedside swallow evaluation   Behaviorial Observations WFL (within functional limits);Alert   Mode of current nutrition Oral diet   Type of oral diet Regular;Thin liquid   Respiratory Status Room air   Comments Bharati Crane is a 89 year old female with a past medical history significant dementia, obstructive sleep apnea, osteoporosis, hypertension, previous left femoral neck fracture status post Girdlestone, now nonambulatory who present to the ER with hip pain after a fall during a transfer and workup revealed a right femoral neck fracture. Pt reports some difficulty swallowing but then just states her mouth is dry and denies any coughing or choking. No documented hx of dysphagia. Pt is adequately alert but limited head of bed elevation d/t hip and knee pain    Clinical Swallow Evaluation   Oral Musculature generally intact   Structural Abnormalities none present   Dentition present and adequate   Mucosal Quality good   Oral Labial Strength and Mobility WFL   Laryngeal Function Swallow;Voicing initiated   Additional Documentation Yes   Swallow Eval   Feeding Assistance dependent   Clinical Swallow Eval: Thin Liquid Texture Trial   Mode of Presentation, Thin Liquids straw;fed by clinician   Volume of Liquid or Food Presented 8 oz    Oral Phase of Swallow WFL   Pharyngeal Phase of Swallow intact   Diagnostic Statement Throat clear x 1 otherwise pt tolerated greater than 8 oz of thin liquids via straw sip without overt s/sx of aspiration, no changes in breath sounds or vocal quality    Clinical Swallow Eval: Solid Food Texture Trial   Mode of Presentation, Solid fed by clinician   Volume of Solid  Food Presented 1 piece of toast    Oral Phase, Solid WFL   Pharyngeal Phase, Solid intact   Diagnostic Statement Adequate oral manipulation, good clearance, no s/sx of aspiration    Swallow Compensations   Swallow Compensations Alternate viscosity of consistencies;Pacing  (Sit upright )   Results No difficulties noted   Esophageal Phase of Swallow   Patient reports or presents with symptoms of esophageal dysphagia Yes   Esophageal comments Intermittent burping    Swallow Eval: Clinical Impressions   Skilled Criteria for Therapy Intervention No problems identified which require skilled intervention   Functional Assessment Scale (FAS) 6   Treatment Diagnosis Functional oropharyngeal dysphagia   Diet texture recommendations Regular diet;Thin liquids   Recommended Feeding/Eating Techniques alternate between small bites and sips of food/liquid;maintain upright posture during/after eating for 30 mins;small sips/bites   Anticipated Discharge Disposition   (Previous living arrangement )   Risks and Benefits of Treatment have been explained. Yes   Patient, family and/or staff in agreement with Plan of Care Yes   Clinical Impression Comments Pt seen for clinical swallow evaluation. She is alert and agreeable. Limited head of bed elevation d/t pt's report of pain in hip and knee. Pt tolerated greater than 8 oz of thin liquids - throat clear x 1, otherwise no overt s/sx of aspiration, no changes in breath sounds or vocal quality. Pt also consumed 1 piece of toast with adequate oral manipulation, good clearance and without s/sx of aspiration. Occasional burping noted, indicative of reflux/esophageal dysfunction also impacted by positioning. Recommend pt continue regular solids and thin liquids. Head of bed should be elevated as high as pt can tolerate to reduce risk for aspiration and reduce s/sx of potential reflux. She will need feeding assistance at this time. No further SLP services warranted. Evaluation only.    Total  Evaluation Time   Total Evaluation Time (Minutes) 20

## 2020-06-02 NOTE — PLAN OF CARE
Discharge Planner SLP   Patient plan for discharge: Did not discuss  Current status: Pt seen for clinical swallow evaluation. She is alert and agreeable. Limited head of bed elevation d/t pt's report of pain in hip and knee. Pt tolerated greater than 8 oz of thin liquids - throat clear x 1, otherwise no overt s/sx of aspiration, no changes in breath sounds or vocal quality. Pt also consumed 1 piece of toast with adequate oral manipulation, good clearance and without s/sx of aspiration. Occasional burping noted, indicative of reflux/esophageal dysfunction also impacted by positioning.     Recommend pt continue regular solids and thin liquids. Head of bed should be elevated as high as pt can tolerate to reduce risk for aspiration and reduce s/sx of potential reflux. She will need feeding assistance at this time. No further SLP services warranted. Evaluation only.     Barriers to return to prior living situation: None per SLP  Recommendations for discharge: Defer to medical team  Rationale for recommendations: No further SLP services warranted. Evaluation only.          Entered by: Jenny Pickard 06/02/2020 8:39 AM

## 2020-06-03 ENCOUNTER — APPOINTMENT (OUTPATIENT)
Dept: PHYSICAL THERAPY | Facility: CLINIC | Age: 85
DRG: 536 | End: 2020-06-03
Payer: MEDICARE

## 2020-06-03 LAB
ANION GAP SERPL CALCULATED.3IONS-SCNC: 6 MMOL/L (ref 3–14)
BUN SERPL-MCNC: 8 MG/DL (ref 7–30)
CALCIUM SERPL-MCNC: 8.2 MG/DL (ref 8.5–10.1)
CHLORIDE SERPL-SCNC: 95 MMOL/L (ref 94–109)
CO2 SERPL-SCNC: 26 MMOL/L (ref 20–32)
CREAT SERPL-MCNC: 0.45 MG/DL (ref 0.52–1.04)
ERYTHROCYTE [DISTWIDTH] IN BLOOD BY AUTOMATED COUNT: 14.2 % (ref 10–15)
GFR SERPL CREATININE-BSD FRML MDRD: 85 ML/MIN/{1.73_M2}
GLUCOSE SERPL-MCNC: 99 MG/DL (ref 70–99)
HCT VFR BLD AUTO: 33.7 % (ref 35–47)
HGB BLD-MCNC: 11.2 G/DL (ref 11.7–15.7)
MCH RBC QN AUTO: 30.8 PG (ref 26.5–33)
MCHC RBC AUTO-ENTMCNC: 33.2 G/DL (ref 31.5–36.5)
MCV RBC AUTO: 93 FL (ref 78–100)
PLATELET # BLD AUTO: 344 10E9/L (ref 150–450)
POTASSIUM SERPL-SCNC: 3.7 MMOL/L (ref 3.4–5.3)
RBC # BLD AUTO: 3.64 10E12/L (ref 3.8–5.2)
SODIUM SERPL-SCNC: 127 MMOL/L (ref 133–144)
WBC # BLD AUTO: 4.1 10E9/L (ref 4–11)

## 2020-06-03 PROCEDURE — 25000128 H RX IP 250 OP 636: Performed by: SURGERY

## 2020-06-03 PROCEDURE — 25000132 ZZH RX MED GY IP 250 OP 250 PS 637: Mod: GY | Performed by: NURSE PRACTITIONER

## 2020-06-03 PROCEDURE — 25000132 ZZH RX MED GY IP 250 OP 250 PS 637: Mod: GY | Performed by: INTERNAL MEDICINE

## 2020-06-03 PROCEDURE — 27110038 ZZH RX 271: Performed by: NURSE PRACTITIONER

## 2020-06-03 PROCEDURE — 25000132 ZZH RX MED GY IP 250 OP 250 PS 637: Mod: GY | Performed by: SURGERY

## 2020-06-03 PROCEDURE — 85027 COMPLETE CBC AUTOMATED: CPT | Performed by: NURSE PRACTITIONER

## 2020-06-03 PROCEDURE — 12000001 ZZH R&B MED SURG/OB UMMC

## 2020-06-03 PROCEDURE — 97530 THERAPEUTIC ACTIVITIES: CPT | Mod: GP

## 2020-06-03 PROCEDURE — 80048 BASIC METABOLIC PNL TOTAL CA: CPT | Performed by: NURSE PRACTITIONER

## 2020-06-03 PROCEDURE — 99358 PROLONG SERVICE W/O CONTACT: CPT | Performed by: INTERNAL MEDICINE

## 2020-06-03 PROCEDURE — 99232 SBSQ HOSP IP/OBS MODERATE 35: CPT | Performed by: NURSE PRACTITIONER

## 2020-06-03 PROCEDURE — 36415 COLL VENOUS BLD VENIPUNCTURE: CPT | Performed by: NURSE PRACTITIONER

## 2020-06-03 PROCEDURE — 25000125 ZZHC RX 250: Performed by: NURSE PRACTITIONER

## 2020-06-03 PROCEDURE — 99233 SBSQ HOSP IP/OBS HIGH 50: CPT | Performed by: INTERNAL MEDICINE

## 2020-06-03 RX ORDER — POLYETHYLENE GLYCOL 3350 17 G/17G
17 POWDER, FOR SOLUTION ORAL 2 TIMES DAILY
Status: DISCONTINUED | OUTPATIENT
Start: 2020-06-03 | End: 2020-06-04 | Stop reason: HOSPADM

## 2020-06-03 RX ORDER — METHADONE HYDROCHLORIDE 10 MG/ML
1 CONCENTRATE ORAL AT BEDTIME
Status: DISCONTINUED | OUTPATIENT
Start: 2020-06-03 | End: 2020-06-04 | Stop reason: HOSPADM

## 2020-06-03 RX ADMIN — LEVOTHYROXINE SODIUM 75 MCG: 0.07 TABLET ORAL at 19:36

## 2020-06-03 RX ADMIN — DICLOFENAC 4 G: 10 GEL TOPICAL at 08:16

## 2020-06-03 RX ADMIN — ROPINIROLE HYDROCHLORIDE 1 MG: 1 TABLET, FILM COATED ORAL at 21:31

## 2020-06-03 RX ADMIN — THERA TABS 1 TABLET: TAB at 08:16

## 2020-06-03 RX ADMIN — SENNOSIDES, DOCUSATE SODIUM 2 TABLET: 8.6; 5 TABLET ORAL at 19:38

## 2020-06-03 RX ADMIN — OXYCODONE HYDROCHLORIDE 5 MG: 5 TABLET ORAL at 06:02

## 2020-06-03 RX ADMIN — OXYCODONE HYDROCHLORIDE 5 MG: 5 TABLET ORAL at 18:04

## 2020-06-03 RX ADMIN — DICLOFENAC 4 G: 10 GEL TOPICAL at 16:50

## 2020-06-03 RX ADMIN — METHOCARBAMOL 750 MG: 750 TABLET, FILM COATED ORAL at 08:16

## 2020-06-03 RX ADMIN — DICLOFENAC 4 G: 10 GEL TOPICAL at 13:00

## 2020-06-03 RX ADMIN — ACETAMINOPHEN 975 MG: 325 TABLET, FILM COATED ORAL at 13:03

## 2020-06-03 RX ADMIN — MELATONIN 2000 UNITS: at 08:16

## 2020-06-03 RX ADMIN — METHOCARBAMOL 750 MG: 750 TABLET, FILM COATED ORAL at 13:03

## 2020-06-03 RX ADMIN — Medication: at 00:09

## 2020-06-03 RX ADMIN — MENTHOL 1 PATCH: 205.5 PATCH TOPICAL at 13:04

## 2020-06-03 RX ADMIN — DICLOFENAC 4 G: 10 GEL TOPICAL at 19:30

## 2020-06-03 RX ADMIN — MELATONIN TAB 3 MG 3 MG: 3 TAB at 21:31

## 2020-06-03 RX ADMIN — OXYCODONE HYDROCHLORIDE 5 MG: 5 TABLET ORAL at 00:34

## 2020-06-03 RX ADMIN — ENOXAPARIN SODIUM 30 MG: 30 INJECTION SUBCUTANEOUS at 21:33

## 2020-06-03 RX ADMIN — POLYETHYLENE GLYCOL 3350 17 G: 17 POWDER, FOR SOLUTION ORAL at 08:15

## 2020-06-03 RX ADMIN — ENOXAPARIN SODIUM 30 MG: 30 INJECTION SUBCUTANEOUS at 13:00

## 2020-06-03 RX ADMIN — CALCIUM 500 MG: 500 TABLET ORAL at 08:16

## 2020-06-03 RX ADMIN — ACETAMINOPHEN 975 MG: 325 TABLET, FILM COATED ORAL at 19:33

## 2020-06-03 RX ADMIN — LOSARTAN POTASSIUM 50 MG: 50 TABLET, FILM COATED ORAL at 08:16

## 2020-06-03 RX ADMIN — ACETAMINOPHEN 975 MG: 325 TABLET, FILM COATED ORAL at 08:16

## 2020-06-03 RX ADMIN — SENNOSIDES, DOCUSATE SODIUM 3 TABLET: 8.6; 5 TABLET ORAL at 08:16

## 2020-06-03 RX ADMIN — CALCIUM 500 MG: 500 TABLET ORAL at 19:41

## 2020-06-03 RX ADMIN — METHADONE HYDROCHLORIDE 1 MG: 10 CONCENTRATE ORAL at 21:32

## 2020-06-03 RX ADMIN — METHOCARBAMOL 750 MG: 750 TABLET, FILM COATED ORAL at 19:33

## 2020-06-03 RX ADMIN — POLYETHYLENE GLYCOL 3350 17 G: 17 POWDER, FOR SOLUTION ORAL at 19:48

## 2020-06-03 RX ADMIN — AMLODIPINE BESYLATE 7.5 MG: 5 TABLET ORAL at 08:16

## 2020-06-03 ASSESSMENT — ACTIVITIES OF DAILY LIVING (ADL)
ADLS_ACUITY_SCORE: 30
ADLS_ACUITY_SCORE: 32

## 2020-06-03 NOTE — PLAN OF CARE
[id number]    ../55 (BP Location: Left arm)   Pulse 90   Temp 96.7  F (35.9  C) (Oral)   Resp 20   Ht 1.524 m (5')   Wt 57.9 kg (127 lb 9.6 oz)   SpO2 97%   BMI 24.92 kg/m        ..Activity: rested in bed, turned   Neuros: orientated to self only, cooperative   Cardiac: WNL, CMS intact   Respiratory: O2 sats 97% on 2L NC, some apnea when sleeping   GI/: abdomen soft/non-tender, voiding spont (pure wick in place)   Diet: regular diet   Lines: none   Incisions/Drains: none   Labs: pending   Pain/nausea: right hip pain controlled partially with scheduled tylenol, prn oxycodone   New changes this shift: none   Plan: continue POC

## 2020-06-03 NOTE — PLAN OF CARE
Vitals:    06/02/20 2225 06/03/20 0806 06/03/20 1207 06/03/20 1529   BP: 118/55 (!) 187/75 (!) 151/63 (!) 141/65   BP Location: Left arm Left arm Left arm Left arm   Pulse:  81 85    Resp: 20 20 20 18   Temp: 96.7  F (35.9  C) 97  F (36.1  C) 97.9  F (36.6  C) 98.8  F (37.1  C)   TempSrc: Oral Oral Oral Axillary   SpO2: 97% 98% 97% 94%   Weight:       Height:         Intermittent confusion noted.  Easily redirectable. PAin well managed with Tylenol and Oxycodone and other  topicals. CMS intact. Good appetite.  Plan to work with PT this afternoon. Continue to follow up per plan of care.

## 2020-06-03 NOTE — PROGRESS NOTES
[facility], [place]  Trauma Service Progress Note    Date of Service (when I saw the patient): 06/03/2020     Assessment & Plan   Trauma mechanism: Fall  Time/date of injury: 5/29/20 3am     Known Injuries:  Right femoral neck fracture     PMH:  Left hip Girdlestone procedure  Dementia     Neuro/Pain:  # Acute pain  - Scheduled Tylenol and robaxin.  - Menthol and Voltaren added yesterday by Palliative  - Encourage Oxycodone use for pain. Had 2 doses overnight, states pain level is manageable today, but definitely noticeable, worse with activity  - Right-sided fascia iliaca catheter placed 05/30 with Ropivacaine per anesthesia   Catheter was clamped today, plan to remove later pending pain level.     # Dementia  # Anxiety  More awake, alert, recalls events that led to the fall.  - Melatonin for sleep  - Monitor neurological status.   - Maintain circadian rhythm.  Lights on during the day.  Off at night, minimize cares at night.  OOB during the day.     Pulmonary:  # DONALD with CPAP use  - continue to encourage CPAP use  - Supplemental oxygen to keep saturation above 92 %.  - Cough and deep breath     Cardiovascular:    # HTN  - Blood pressures are slightly higher in the am, probably related to pain as well  - continue PTA Amlodipine with slightly increased dose to 7.5mg daily.     GI/Nutrition:    - OK for regular diet  - Aspiration precautions, upright and alert for all oral intake     Renal/ Fluids/Electrolytes:  # Hyponatremia, stable  - electrolyte replacement protocol  In place.   - Will recheck BMP tomorrow morning     Endocrine:  # Hypothyroidism  - continue PTA medications  - Goal to keep BG < 180 for optimal wound healing      Infectious disease:   - No indications for antibiotics.      Hematology:    #Acute on chronic anemia, stable in the 10 range  - Threshold for transfusion if hgb <7.0 or signs/symptoms of hypoperfusion.        Musculoskeletal:  # Right femoral neck  "fracture  -Managing non operatively, given non weight bearing status at baseline, with multiple complications S/P left hip hardware failure resulting in a girdlestone procedure thus non weightbearing, and advanced age.  - Focus on pain control.   - Orthopedic surgery is OK with up in chair based on comfort.  - Physical and occupational therapy consults      Skin:  #  No acute issues  - dilgent cares to prevent skin breakdown and wound formation.   Frequent repositioning      Palliative care on board to assist with goals of care planning and pain control.    Lines/ tubes/ drains:  - PIV     Code status:  DNR/DNI     General Cares:               PPI/H2 blocker:  N/A              DVT prophylaxis: Enoxaparin SQ              Bowel Regimen/Date of last stool: senna and miralax daily. 06/01              Pulmonary toilet: IS, cough and deep breath     Discharge goals:     Adequate pain management: better today    VSS x24 hours: yes    Hemoglobin stable x 48 hours: yes    Ambulating safely and/or therapy evals complete: ordered    Drains/lines removed or plan in place to manage: yes    Teaching done: on-going     Expected D/C date: Anticipate Thursday preferably vs Friday to [facility].    Interval History   Awake, alert, eating breakfast. Denies numbness of the toes and feet. Denies shortness of breath, \" I think I need to use the bathroom\".  ROS x 8 negative with exception of those things listed in interval hx    Physical Exam   Temp: 97  F (36.1  C) Temp src: Oral BP: (!) 187/75 Pulse: 81 Heart Rate: 79 Resp: 20 SpO2: 98 % O2 Device: None (Room air)    Vitals:    05/28/20 2025 05/30/20 1700   Weight: 60.5 kg (133 lb 6.4 oz) 57.9 kg (127 lb 9.6 oz)     Vital Signs with Ranges  Temp:  [96.4  F (35.8  C)-97  F (36.1  C)] 97  F (36.1  C)  Pulse:  [81-90] 81  Heart Rate:  [79] 79  Resp:  [16-20] 20  BP: (118-187)/(55-86) 187/75  SpO2:  [94 %-98 %] 98 %  I/O last 3 completed shifts:  In: 1040 [P.O.:1040]  Out: 950 " [Urine:950]    [place] Coma Scale - Total 14/15  Constitutional: Awake, alert, cooperative, no apparent distress.  ENT: Normocephalic, atraumatic  Respiratory: No increased work of breathing, good air exchange, clear to auscultation bilaterally, no crackles or wheezing.  Cardiovascular:  regular rate and rhythm, normal S1 and S2, no S3 or S4, and no murmur.   GI: Normal bowel sounds, abdomen soft, non-distended, non-tender, no guarding  Genitourinary:  Clear yellow  Skin:  Normal skin color, warm and dry.  Musculoskeletal: Right hip edema, +2 pedal pulse palpated.  Neurologic: Awake, alert, oriented to person and situation.  No focal deficits.  Neuropsychiatric: Calm, normal eye contact, alert,    [place] JULIOCESAR Schmidt CNP  To contact the trauma service use job code pager [phone],   Numeric texts or alpha text through [facility]     no

## 2020-06-03 NOTE — PLAN OF CARE
Discharge Planner PT   Patient plan for discharge: not discussed  Current status: PT: Pt agreeable to working with PT, with some encouragement on benefits of moving for healing. Pt max A x2 sup<>sitting using draw sheet, kept pillow between legs for comfort. Once sitting progressed from heavy mod A to light min A to maintain balance sitting EOB for 10 minutes, by end holding rail with L UE and pushing on bed with R UE for support. Dependent transfer back into bed and positioning, pt sleepy at end and NST replacing Pure wic.  Barriers to return to prior living situation: cognitive status, L previously NWB and now R LE NWB, medical and functional status  Recommendations for discharge: LTC  Rationale for recommendations: Pt will be dependent for transfers for the foreseeable future       Entered by: Claudia Marcos 06/03/2020 5:59 PM

## 2020-06-03 NOTE — PROGRESS NOTES
"Social Work Services Progress Note    Hospital Day: 7  Date of Initial Social Work Evaluation:  Not yet completed  Collaborated with:  Chart review, team rounds, Trauma NP Sherice, [facility] (p. [phone], f. [phone], have access to [employer]), Dr. Jimenez with Palliative Care, pt's daughter Princess    Data:  Pt is a 89 year old female with dementia whom admitted from [facility] after falling and sustaining a right femoral neck fracture.     Pt has a LTC bed hold at [facility].     Received update from Trauma NP Sherice that pt's OnQ has been clamped and she is anticipating pt to be ready for discharge Thursday. Per Sherice pt is not her own decision maker.     Received update from Dr. Jimenez with Palliative Care that she spoke with family today and they would like for pt to return to [facility] and attempt rehab and potentially pursue hospice in the future.     Intervention:  SW contacted [facility]- spoke with Tarsha in admissions and provided update and she indicated pt is ok to return when ready. Tarsha declined to have SW send any updated notes as she has access to Epic. Tarsha noted that facility would prefer pt return between 11am-1pm on day of discharge.     SW contacted pt's daughter Princess ([phone]) to introduce self, check in, and discuss discharge planning. KEI asked how things have been going for pt at [facility] and Princess reported that pt really likes it there and the staff there have done a nice job taking care of her. Princess reported that pt is \"considering that home at this point\". Princess indicated a plan to have pt return to [facility] when medically ready for discharge.   Transportation options were discussed and KEI explained that per bedside nurse Ysabel pt would not be able to tolerate sitting in a w/c for transport therefore would need stretcher transport. KEI explained the potential out of pocket expense for this. Princess reported that " "pt wouldn't be able to transport in a personal vehicle and said \"I don't think we have a choice\". Princess noted that she has been working on applying for [place] for pt and SW explained that if this becomes activated Princess could submit a transportation bill to MA to attempt to get it covered.   Princess commented, \"I just want her to be relived of pain whatever that takes\". Princess inquired about hospice care costs and SW explained the Medicare hospice benefit and what this generally covers, and also explained private pay room and board at SNF without MA vs family being 24/7 caregivers with home hospice and what this would entail. Princess reported that she and family are still trying to sort everything out and commented \"she always wanted to go really fast\". Princess said family was \"prepared for fast\" but not for a longer process. SW provided supportive listening.     Princess denied having further SW needs or questions at this time and SW explained Princess will be kept updated as pt's discharge progresses.     Stretcher transport (due to dementia and hip fracture/inability to sit) arranged via [facility] ([phone]) for Thursday 6/4/20 at 12pm.     Assessment:  See bedside RN, PT/OT, medical team notes    Plan:    Anticipated Disposition:  Facility:  LTC at [facility]    Barriers to d/c plan:  Medical stability    Follow Up:  KEI CARRERO will continue to follow    DORETHA Montenegro, LICSW  7B   [phone] (pager) [phone]  6/3/2020          "

## 2020-06-03 NOTE — PROGRESS NOTES
REGIONAL ANESTHESIA PAIN SERVICE CONTINUOUS NERVE INFUSION NOTE  Bharati MADHURI Crane is a 89 year old female with previous history of left femoral neck fracture s/p girdlestone,  right femoral neck fracture who is now CD #4 right fascia iliaca nerve block catheter placed on 5/30 for pain managment    Subjective and Interval History: Overnight no acute events.  Plans for possible discharge tomorrow so nerve block infusion stopped at 0730; patient seen at 0815. Bharati reports R) hip and buttocks pain, burning and grinding, medium amount of pain that is tolerable with current interventions including nerve block continuous infusion, scheduled Tylenol, diclofenac gel, robaxin, and PRN oxycodone (past 2 hrs received 5 mg x 3 doses).  Denies paresthesias, circumoral numbness, metallic taste or tinnitus. Patient is tolerating a regualr diet, denies nausea.      Antithrombotic/Thrombolytic Therapy ordered:    enoxaparin ANTICOAGULANT (LOVENOX) injection 30 mg, SC, Q12H  Last dose given 6/2/20 (yesterday) at 2224         Analgesic Medications:   Medications related to Pain Management (From now, onward)    Start     Dose/Rate Route Frequency Ordered Stop    06/02/20 1600  diclofenac (VOLTAREN) 1 % topical gel 4 g      4 g Transdermal 4 TIMES DAILY 06/02/20 1550      06/02/20 0830  ropivacaine 0.2% (NAROPIN) 750 mL in ON-Q C-Bloc select flow ([id number] holds 600-750 mL) single cath disposable pump      10 mL/hr  Irrigation Elastomeric Pump 06/02/20 0811      05/31/20 0843  sodium phosphate (FLEET ENEMA) 1 enema      1 enema Rectal ONCE PRN 05/31/20 0843      05/30/20 2000  senna-docusate (SENOKOT-S/PERICOLACE) 8.6-50 MG per tablet 2-3 tablet      2-3 tablet Oral 2 TIMES DAILY 05/30/20 0934      05/30/20 0945  polyethylene glycol (MIRALAX) Packet 17 g      17 g Oral DAILY 05/30/20 0934      05/29/20 1430  methocarbamol (ROBAXIN) tablet 750 mg      750 mg Oral 3 TIMES DAILY 05/29/20 1316      05/29/20 1316  oxyCODONE (ROXICODONE) tablet 5 mg       5 mg Oral EVERY 3 HOURS PRN 05/29/20 1316      05/29/20 1030  acetaminophen (TYLENOL) tablet 975 mg      975 mg Oral 3 TIMES DAILY 05/29/20 0943      05/29/20 0943  lidocaine 1 % 1 mL      1 mL Other EVERY 1 HOUR PRN 05/29/20 0943      05/29/20 0943  lidocaine (LMX4) cream       Topical EVERY 1 HOUR PRN 05/29/20 0943      05/29/20 0943  bisacodyl (DULCOLAX) Suppository 10 mg      10 mg Rectal DAILY PRN 05/29/20 0943             Objective:  Lab results  Recent Labs   Lab Test 06/03/20  0656   WBC 4.1   RBC 3.64*   HGB 11.2*   HCT 33.7*   MCV 93   MCH 30.8   MCHC 33.2   RDW 14.2          No results found for: INR    Vitals:    Temp:  [96.4  F (35.8  C)-97  F (36.1  C)] 97  F (36.1  C)  Pulse:  [81-90] 81  Heart Rate:  [79] 79  Resp:  [16-20] 20  BP: (118-187)/(55-86) 187/75  SpO2:  [94 %-98 %] 98 %    Exam:   GEN: alert, pleasant, and no pain behaviors  NEURO/MSK: RLE movement limited by pain and nerve block  SKIN: right fascia iliaca catheter site with dressing c/d/i, no tenderness, erythema, heme, edema      Assessment and Plan:     Bharati Crane is a 89 year old female with previous history of left femoral neck fracture s/p girdlestone,  right femoral neck fracture who is now CD #4 right fascia iliaca nerve block catheter placed on 5/30 for pain management    Patient is receiving adequate analgesia with current multimodal therapy including nerve block catheter with infusion of ROpivacaine 0.2% at 10 mL/hr.  Motor function intact and adequate sensory block, is meeting activity goals.  No evidence of adverse side effects related to local anesthetic. If pain well controlled without nerve block infusion, patient will likely discharge    - 0730 R) nerve block catheter clamped  - antithrombotic/thrombolytic therapy Hold Enoxaparin (Lovenox) SQ until after nerve block catheter removed. Please contact RAPS (#[phone]) prior to any medication changes    - 1125 reports right hip pain, tolerable.  R) nerve block  catheter removed.  Dark tip intact, insertion site c/d/i, small bandage applied. Okay to administer enoxaparin now, bedside RN aware  - RAPS will sign off    - discussed plan with attending anesthesiologist    JULIOCESAR Andrews [facility]  Regional Anesthesia Pain Service  6/3/2020 8:51 AM    RAPS Contact Info (24 hour job code pager is the last 4 digits) For in-house use only:   Job code ID: [id number]   [place] 0599  Peds [phone]  Rick phone: dial * * * [phone], enter jobcode ID, then enter call-back number.    Text: Use [employer]COM on the Intranet <Paging/Directory> tab and enter Jobcode ID.   If no call back at any time, contact the hospital  and ask for RAPS attending or backup

## 2020-06-03 NOTE — PROGRESS NOTES
[facility]  Palliative Care Daily Progress Note       Recommendations & Counseling       Scheduled tylenol, icy hot, diclofenac and methocarbamol      Started very low dose methadone at bedtime 1 mg for low dose long acting pain control at night given she has consistently needed nighttime doses of opioids.  discussed risks/benefits of long acting opioid in elderly patient with daughter princess today including risk of sedation, AMS, respiratory depression but given her anticipated ongoing needs for pain control and limited ability to advocate for herself in setting of dementia family was in favor of trying long acting agent and methadone chose because we can give it in very low doses. She may need dose adjustments over time as this is a very low starting dose but would not increase more frequently then every 7 days and would stop if it is causing excess sedation and/or if her pain is better and she is not needing it.       Continue prn oxycodone 5mg q 4 hours prn pain      Goals and plan of care: discharge to LTC facility with rehab services for now, DNR/DNI, avoid rehospitalization in future, if has health decline then would want to transition to hospice care likely at home with family. POLST form was completed to summarize this plan.     Goals discussion summary: long discussion with family Princess on phone (50 minutes) reviewing medical concerns anticipated course and plan of care. Discussed how Bharati will not be expected to walk again and how it is uncertain if she will ever tolerate sitting up in chair though that may be possible over time. We talked about elderly patients can have limited life expectancy after hip fracture and how that often is due to issues with poor PO intake, aspiration, AMS and pneumonias/respiratory failure. Bharati is not having any of these issues currently (eating well, breathing and swallowing well) and so her prognosis in terms of timeline is  uncertain. Princess says that if time was clearly short then family would want to take her home with hospice so they could visit her (given the visitation restrictions currently with COVID19). However with uncertain timeline and the hope that she may be able to work toward sitting up in a chair at some point, right now they want to try to have her go back to her facility and work on rehab for a time. The primary goal is pain control and quality of life. They do not want to do rehospitalization for her if she had a health decline but would rather at that time get hospice care involved. We completed a POLST form that indicates DNR/DNI and comfort focused care with plan for no rehospitalization and no tube feeding.           Total time spent was 75 minutes,  >50% of time was spent counseling and/or coordination of care regarding symptom assessment, goals and plan of care. 50 minutes in phone conversation with daughter Princess discussing goals of care and care planning.       Lilia Jimenez MD / Palliative Medicine / Pager [phone] / [facility] Inpatient Team Consult Pager [phone] (answered 8am-430pm M-F) - ok to text page via [employer] / After-Hours Answering Service [phone] / Palliative Clinic in the [facility] the [facility] [place] [facility] - [phone] (scheduling); [phone] (triage).          Assessments          Bharati Crane is a 89 year old female with a past medical history significant dementia, obstructive sleep apnea, osteoporosis, hypertension, previous left femoral neck fracture status post Girdlestone, now nonambulatory who present to the ER with hip pain after a fall during a transfer and workup revealed a right femoral neck fracture.      Today, the patient was seen for:  Femoral neck fracture  Pain due to above  Goals of care    Prognosis, Goals, or Advance Care Planning was addressed today with: No.  Mood, coping, and/or meaning in the context of serious illness were addressed today:  No.  Summary/Comments:            Interval History:     Chart review/discussion with unit or clinical team members:   Stopped nerve block this am    Had total 15mg oxycodone yesterday with the nerve block infusion running    Per patient or family/caregivers today:  Poor historian due to dementia --denies pain at rest but does have discomfort in hip with repositioning    Key Palliative Symptoms:  We are not managing pain in this patient  We are not managing dyspnea in this patient  We are not managing nausea in this patient  We are not managing anxiety in this patient               Review of Systems:     Besides above, an additional system ROS was reviewed and is unremarkable          Medications:     I have reviewed this patient's medication profile and medications during this hospitalization.    Noted meds:    Scheduled tylenol tid  Melatonin 3mg at bedtime  Methocarbamol 750mg tid  miralax daily  Ropinirole 1mg at bedtime  Senna 2-3 tabs bid    PRN:  Bisacodyl supp  Hydromorphone IV 0.2mg  Ondansetron 4mg  Oxycodone 5mg q 3 hours prn            Physical Exam:   Vitals were reviewed  Temp: 97.9  F (36.6  C) Temp src: Oral BP: (!) 151/63 Pulse: 85 Heart Rate: 79 Resp: 20 SpO2: 97 % O2 Device: None (Room air)    Gen: sitting/lying in bed. Appears comfortable  HEENT: NCAT. Conjunctiva clear. Sclera anicteric .  CV: RRR , Peripheral perfusion intact.   Resp: unlabored work of breathing,   Abd: soft, nt, nd, +BS   Msk: nerve block catheter noted at right thigh; mild pain with palpation of right hip and with flexion/rotation of hip and with brief attempt at turns  Skin:  no jaundice  Ext: warm, well perfused.   Neuro: face symmetric. EOM, vision, hearing grossly intact. Speech fluent. Moves all extremities   Mental status/Psych: drowsy (we woke her from sleep), disoriented to place and situation but calm and cooperative                Data Reviewed:     Reviewed recent pertinent imaging, comments:   5/28 xray  hip/pelvis  IMPRESSION: There is a displaced right femoral neck fracture. 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 Deformity of the right pubic rami appears to be old.    Reviewed recent labs, comments:   GFR>90, cre 0.45, hg 11.2

## 2020-06-04 VITALS
WEIGHT: 127.6 LBS | OXYGEN SATURATION: 94 % | HEART RATE: 80 BPM | BODY MASS INDEX: 25.05 KG/M2 | SYSTOLIC BLOOD PRESSURE: 162 MMHG | DIASTOLIC BLOOD PRESSURE: 72 MMHG | RESPIRATION RATE: 18 BRPM | HEIGHT: 60 IN | TEMPERATURE: 98 F

## 2020-06-04 LAB — PLATELET # BLD AUTO: 331 10E9/L (ref 150–450)

## 2020-06-04 PROCEDURE — 36415 COLL VENOUS BLD VENIPUNCTURE: CPT | Performed by: SURGERY

## 2020-06-04 PROCEDURE — 25000132 ZZH RX MED GY IP 250 OP 250 PS 637: Mod: GY | Performed by: NURSE PRACTITIONER

## 2020-06-04 PROCEDURE — 25000132 ZZH RX MED GY IP 250 OP 250 PS 637: Mod: GY | Performed by: SURGERY

## 2020-06-04 PROCEDURE — 25000132 ZZH RX MED GY IP 250 OP 250 PS 637: Mod: GY | Performed by: INTERNAL MEDICINE

## 2020-06-04 PROCEDURE — 99239 HOSP IP/OBS DSCHRG MGMT >30: CPT | Performed by: NURSE PRACTITIONER

## 2020-06-04 PROCEDURE — 40000275 ZZH STATISTIC RCP TIME EA 10 MIN

## 2020-06-04 PROCEDURE — 25000128 H RX IP 250 OP 636: Performed by: SURGERY

## 2020-06-04 PROCEDURE — 85049 AUTOMATED PLATELET COUNT: CPT | Performed by: SURGERY

## 2020-06-04 RX ORDER — POLYETHYLENE GLYCOL 3350 17 G/17G
1 POWDER, FOR SOLUTION ORAL 2 TIMES DAILY
DISCHARGE
Start: 2020-06-04

## 2020-06-04 RX ORDER — BISACODYL 10 MG
10 SUPPOSITORY, RECTAL RECTAL DAILY PRN
DISCHARGE
Start: 2020-06-04

## 2020-06-04 RX ORDER — OXYCODONE HYDROCHLORIDE 5 MG/1
5 TABLET ORAL EVERY 4 HOURS PRN
Qty: 30 TABLET | Refills: 0 | Status: SHIPPED | OUTPATIENT
Start: 2020-06-04

## 2020-06-04 RX ORDER — METHOCARBAMOL 750 MG/1
750 TABLET, FILM COATED ORAL 3 TIMES DAILY
Qty: 90 TABLET | DISCHARGE
Start: 2020-06-04

## 2020-06-04 RX ORDER — OXYCODONE HYDROCHLORIDE 5 MG/1
5 TABLET ORAL ONCE
Status: COMPLETED | OUTPATIENT
Start: 2020-06-04 | End: 2020-06-04

## 2020-06-04 RX ORDER — AMLODIPINE BESYLATE 5 MG/1
7.5 TABLET ORAL DAILY
DISCHARGE
Start: 2020-06-04

## 2020-06-04 RX ORDER — ACETAMINOPHEN 325 MG/1
975 TABLET ORAL 3 TIMES DAILY
DISCHARGE
Start: 2020-06-04

## 2020-06-04 RX ORDER — AMOXICILLIN 250 MG
2-3 CAPSULE ORAL 2 TIMES DAILY
DISCHARGE
Start: 2020-06-04

## 2020-06-04 RX ORDER — METHADONE HYDROCHLORIDE 10 MG/ML
1 CONCENTRATE ORAL AT BEDTIME
Qty: 30 ML | Refills: 0 | Status: SHIPPED | OUTPATIENT
Start: 2020-06-04

## 2020-06-04 RX ORDER — LIDOCAINE 4 G/G
1-3 PATCH TOPICAL EVERY 24 HOURS
DISCHARGE
Start: 2020-06-04

## 2020-06-04 RX ORDER — LANOLIN ALCOHOL/MO/W.PET/CERES
3 CREAM (GRAM) TOPICAL AT BEDTIME
DISCHARGE
Start: 2020-06-04

## 2020-06-04 RX ADMIN — DICLOFENAC 4 G: 10 GEL TOPICAL at 11:53

## 2020-06-04 RX ADMIN — OXYCODONE HYDROCHLORIDE 5 MG: 5 TABLET ORAL at 03:29

## 2020-06-04 RX ADMIN — ENOXAPARIN SODIUM 30 MG: 30 INJECTION SUBCUTANEOUS at 10:25

## 2020-06-04 RX ADMIN — POLYETHYLENE GLYCOL 3350 17 G: 17 POWDER, FOR SOLUTION ORAL at 08:49

## 2020-06-04 RX ADMIN — ACETAMINOPHEN 975 MG: 325 TABLET, FILM COATED ORAL at 08:49

## 2020-06-04 RX ADMIN — DICLOFENAC 4 G: 10 GEL TOPICAL at 08:49

## 2020-06-04 RX ADMIN — LOSARTAN POTASSIUM 50 MG: 50 TABLET, FILM COATED ORAL at 08:48

## 2020-06-04 RX ADMIN — MENTHOL 1 PATCH: 205.5 PATCH TOPICAL at 08:48

## 2020-06-04 RX ADMIN — AMLODIPINE BESYLATE 7.5 MG: 5 TABLET ORAL at 08:48

## 2020-06-04 RX ADMIN — OXYCODONE HYDROCHLORIDE 5 MG: 5 TABLET ORAL at 10:24

## 2020-06-04 RX ADMIN — THERA TABS 1 TABLET: TAB at 08:49

## 2020-06-04 RX ADMIN — OXYCODONE HYDROCHLORIDE 5 MG: 5 TABLET ORAL at 12:21

## 2020-06-04 RX ADMIN — SENNOSIDES, DOCUSATE SODIUM 2 TABLET: 8.6; 5 TABLET ORAL at 08:49

## 2020-06-04 RX ADMIN — OXYCODONE HYDROCHLORIDE 5 MG: 5 TABLET ORAL at 04:49

## 2020-06-04 RX ADMIN — OXYCODONE HYDROCHLORIDE 5 MG: 5 TABLET ORAL at 12:19

## 2020-06-04 RX ADMIN — MELATONIN 2000 UNITS: at 08:49

## 2020-06-04 RX ADMIN — CALCIUM 500 MG: 500 TABLET ORAL at 08:49

## 2020-06-04 RX ADMIN — METHOCARBAMOL 750 MG: 750 TABLET, FILM COATED ORAL at 08:49

## 2020-06-04 ASSESSMENT — ACTIVITIES OF DAILY LIVING (ADL)
ADLS_ACUITY_SCORE: 34
ADLS_ACUITY_SCORE: 32
ADLS_ACUITY_SCORE: 30
ADLS_ACUITY_SCORE: 32

## 2020-06-04 NOTE — PLAN OF CARE
Time: 2300 - 0730  Status: Right femoral neck fracture after a fall during a transfer  Vitals: BP (!) 149/61 (BP Location: Left arm)   Pulse 85   Temp 98.3  F (36.8  C) (Axillary)   Resp 18   Ht 1.524 m (5')   Wt 57.9 kg (127 lb 9.6 oz)   SpO2 92%   BMI 24.92 kg/m       Activity: Pt remained in bed overnight. Repositioned Q2 as pt would allow d/t pain.   Pain: Pt woke up at 0315 crying c/o intolerable (R) hip pain. PRN oxy given x 1 with no relief. Team paged and order obtained for another dose of 5 mg oxy.   Neuro: Disoriented to time, place, and situation. Calm and cooperative with cares. Denies numbness/tingling in LE.  Cardiac: Hypertensive - within parameters. Denies cardiac chest pain.  Respiratory: LS diminished. On RA. Refused BiPAP overnight. Desats periodically while sleeping, but increases quickly.   GI/: Eh changed this shift. Currently in place with adequate urine output. BS+. No BM this shift.   Diet: Regular diet  Skin: Intact  LDAs: No access  New change for this shift: None.  Plan: Discharge today. Transport to arrive at 1200. Continue with POC.

## 2020-06-04 NOTE — PLAN OF CARE
BP (!) 149/61 (BP Location: Left arm)   Pulse 85   Temp 98.3  F (36.8  C) (Axillary)   Resp 18   Ht 1.524 m (5')   Wt 57.9 kg (127 lb 9.6 oz)   SpO2 92%   BMI 24.92 kg/m       Neuro: oriented to self, cooperative  Cardiac: HTN this shift, likely due to pt reporting pain  Respiratory: on RA, denies SOB  GI/: purewick in place, pt had BM during day shift  Skin: intact  Pain: PRN oxy given x1, scheduled tylenol, robaxin and methadone give  LDA: none  Activity: bedrest, turned  Diet/Appetite: regular diet, tolerating well, good appetite   Plan: discharge tomorrow scheduled, continue POC

## 2020-06-04 NOTE — PROGRESS NOTES
Social Work Services Discharge Note      Patient Name:  Bharati Crane     Anticipated Discharge Date:  6/4/20    Discharge Disposition:   LT:  [facility]   [address]  [place], [place]  RN to RN report: [phone]  Fax: [phone]    Following MD:  Per facility designation     Pre-Admission Screening (PAS) online form has been completed.  The Level of Care (LOC) is:  Determined  Confirmation Code is:  Not needed as pt is returning to facility  Patient/caregiver informed of referral to [facility] for Pre-Admission Screening for skilled nursing facility (SNF) placement and to expect a phone call post discharge from SNF.     Additional Services/Equipment Arranged:  Stretcher transport arranged via [employer] [facility] ([phone]) for today at 12pm. Pt has no other equipment needs.      Patient / Family response to discharge plan:  Pt is not her own decision maker per Trauma team. Pt's daughter Princess is agreeable and requested a call from Trauma team- Trauma NP Sherice was informed of this request.      Persons notified of above discharge plan:  Pt's daughter Princess ([phone]), bedside nurse, charge nurse, NST, receiving facility, Trauma team    Staff Discharge Instructions:  Please fax discharge orders and signed hard scripts for any controlled substances.  Please print a packet and send with patient.     CTS Handoff completed:  YES    Medicare Notice of Rights provided to the patient/family:  YES    DORETHA [place]en[place], [facility] Center[facility]  [facility]   [phone] (pager) [phone]  6/4/2020

## 2020-06-04 NOTE — DISCHARGE SUMMARY
[facility], [place]    Discharge Summary  Trauma Surgery Service    Date of Admission:  5/28/2020  Date of Discharge:  6/4/2020  Attending Physician: Dr. Akin Robles  Discharging Provider: Sherice Cabrera CNP  Date of Service (when I saw the patient): 06/04/20    Primary Provider: Kari Vyas  Primary Care clinic: [facility] [address] [address]  Phone: [phone]  Fax number: [phone]     Discharge Diagnoses      Closed fracture of neck of right femur, initial encounter (H)  Injury of head, initial encounter  Presence of left artificial hip joint  Other fall on same level, initial encounter  Benign essential hypertension    Hospital Course   Bharati Crane is a 89 year old female who presented to our ED via EMS with hip pain. Per chart review her PMH Is significant for a prior left hip fracture S/P ORIF , complicated by hardware failure with subsequent girdlestone procedure. She was non weight bearing secondary the the above. She denied any other complaints. She was admitted to the trauma team, her hospital course and discharge plan below.    Traumatic Injury  The patient sustained the above injury as a result of fall during transfer.  The mechanism of injury and factors contributing to the accident were discussed with the patient.  Strategies on how to prevent future accidents were reviewed.  The patient underwent tertiary examination to evaluate for additional injuries.  The systematic review did not find any other injuries.    Right hip fracture  Orthopedic Injury:  Bharati Crane was evaluated by Orthopedics and her injury was managed non operatively secondary to her weightbearing status, prior complications with left femur ORIF. Her pain was managed as below. She was discharged to her long term care facility and is recommended to follow up with her prior Orthopedic surgeon as needed. She should not bear any weight on both lower extremities. She  can be assisted to a chair only as pain tolerated.    Acute traumatic pain  Pain was controlled with a multi-modality approach.  The current regimen for Bharati Crane includes the following, scheduled acetaminophen, topical analgesic and PRN short acting opioids. She also had a regional peripheral block via continuous infusion which was discontinued on 06/03/2020. Adequate pain control was achieved with this regimen.  We anticipate that they will taper off this regimen over the next several weeks.  Methadone: She was started on a very low dose of Methadone at 1mg at bedtime per recommendations of the Palliative Service. She should continue taking Oxycodone as needed as well. See below in italics for palliative pain recommendations by Dr. Jennifer Jimenez on 06/03/2020.      Scheduled tylenol, icy hot, diclofenac and methocarbamol       Started very low dose methadone at bedtime 1 mg for low dose long acting pain control at night given she has consistently needed nighttime doses of opioids.  discussed risks/benefits of long acting opioid in elderly patient with daughter ronnell today including risk of sedation, AMS, respiratory depression but given her anticipated ongoing needs for pain control and limited ability to advocate for herself in setting of dementia family was in favor of trying long acting agent and methadone chose because we can give it in very low doses. She may need dose adjustments over time as this is a very low starting dose but would not increase more frequently then every 7 days and would stop if it is causing excess sedation and/or if her pain is better and she is not needing it.        Continue prn oxycodone 5mg q 4 hours prn pain    Palliative Care Consult  The palliative care team was consulted to assist in the care and future life planning regarding the changes the above injuries have created. Often this sort of injury is a clear marker of general decline in the aged population.  During their  time with the patient and family, these are the things they focused on this admission:  discussion of goals of care: , teach pt/family coping skills , discussion of health care directive/ POLST  and aid in pain management    Therapy Recommendations:   OK to attempt therapies    Pending Results   Code Status   DNR / DNI    SUBJECTIVE: I discussed the above discharge plan with her daughter     Physical Exam   Temp: 98  F (36.7  C) Temp src: Axillary BP: (!) 162/72 Pulse: 80 Heart Rate: 88 Resp: 18 SpO2: 94 % O2 Device: None (Room air)    Vitals:    05/28/20 2025 05/30/20 1700   Weight: 60.5 kg (133 lb 6.4 oz) 57.9 kg (127 lb 9.6 oz)     Vital Signs with Ranges  Temp:  [97  F (36.1  C)-98.8  F (37.1  C)] 98  F (36.7  C)  Pulse:  [80] 80  Heart Rate:  [82-89] 88  Resp:  [18] 18  BP: (141-194)/(61-84) 162/72  SpO2:  [92 %-98 %] 94 %  I/O last 3 completed shifts:  In: 600 [P.O.:600]  Out: 1000 [Urine:1000]     Jayshree Coma Scale - Total 14/15  Constitutional: Awake, alert, cooperative, pleasant  ENT: Normocephalic, atraumatic  Respiratory: No increased work of breathing, good air exchange, clear to auscultation bilaterally, no crackles or wheezing.  Cardiovascular:  regular rate and rhythm, normal S1 and S2, no S3 or S4, and no murmur.   GI: Normal bowel sounds, abdomen soft, non-distended, non-tender, no guarding  Genitourinary:  Clear yellow  Skin:  Normal skin color, warm and dry.  Musculoskeletal: Right hip edema, +2 pedal pulse palpated.  Neurologic: Awake, alert, oriented to person and situation.  No focal deficits.  Neuropsychiatric: Calm, normal eye contact, alert,    Discharge Disposition   Discharged to Long term Care  Condition at discharge: Stable  Discharge VS: Blood pressure (!) 162/72, pulse 80, temperature 98  F (36.7  C), temperature source Axillary, resp. rate 18, height 1.524 m (5'), weight 57.9 kg (127 lb 9.6 oz), SpO2 94 %.    Consultations This Hospital Stay   REGIONAL ANESTHESIA PAIN SERVICE ADULT  IP CONSULT  PALLIATIVE CARE ADULT IP CONSULT  OCCUPATIONAL THERAPY ADULT IP CONSULT  PHYSICAL THERAPY ADULT IP CONSULT    Discharge Orders      General info for SNF    Length of Stay Estimate: Long Term Care  Condition at Discharge: Stable  Level of care:board and care  Rehabilitation Potential: Fair  Admission H&P remains valid and up-to-date: Yes  Recent Chemotherapy: N/A  Use Nursing Home Standing Orders: Yes     Mantoux instructions    Give two-step Mantoux (PPD) Per Facility Policy Yes     Reason for your hospital stay    Fall   Right femoral neck fracture (non operable)     Intake and output    Every shift     Additional Discharge Instructions     Weight bearing status    Chair or wheelchair bound     Activity - Up with nursing assistance    Can get out of bed to the chair as pain tolerates.  Non weight bearing bilateral lower extremities (Hx of Girdle stone procedure on left and hip fracture on the right side)     Encourage PO fluids     Follow Up and recommended labs and tests    Follow up with Nursing home physician.  No follow up labs or test are needed.  Follow up on tolerance to pain regimen.    May follow up as needed with:   Prior Orthopedic Provider at [facility]  Verenice Vera PA-C (Attending)  [phone] (Work)  [phone] (Fax)  [address] [address]     DNR/DNI     Occupational Therapy Adult Consult    Evaluate and treat as clinically indicated.    Reason: Right hip fracture, not surgically repaired  See activity for mobility restrictions     Physical Therapy Adult Consult    Evaluate and treat as clinically indicated.    Reason:  Right hip fracture, not surgically repaired  See activity for mobility restrictions     Fall precautions     Advance Diet as Tolerated    Follow this diet upon discharge: Regular     Discharge Medications   Current Discharge Medication List      START taking these medications    Details   bisacodyl (DULCOLAX) 10 MG suppository Place 1  suppository (10 mg) rectally daily as needed for constipation (IF Miralax ineffective)  Qty:      Associated Diagnoses: Closed fracture of neck of right femur, initial encounter (H)      diclofenac (VOLTAREN) 1 % topical gel Place 4 g onto the skin 4 times daily  Qty:      Associated Diagnoses: Closed fracture of neck of right femur, initial encounter (H)      enoxaparin ANTICOAGULANT (LOVENOX) 30 MG/0.3ML syringe Inject 0.3 mLs (30 mg) Subcutaneous every 12 hours    Associated Diagnoses: Closed fracture of neck of right femur, initial encounter (H)      Lidocaine (LIDOCARE) 4 % Patch Place 1-3 patches onto the skin every 24 hours Can be applied to the left hip area or other areas of pain as well  To prevent lidocaine toxicity, patient should be patch free for 12 hrs daily.    Associated Diagnoses: Closed fracture of neck of right femur, initial encounter (H)      menthol (ICY HOT) 5 % PTCH Apply 1 patch topically every 8 hours as needed for muscle soreness  Qty:      Associated Diagnoses: Closed fracture of neck of right femur, initial encounter (H)      methadone (DOLOPHINE-INTENSOL) 10 MG/ML (HIGH CONC) solution Take 0.1 mLs (1 mg) by mouth At Bedtime  Qty: 30 mL, Refills: 0    Comments: Please note dose concerntration  Associated Diagnoses: Closed fracture of neck of right femur, initial encounter (H)      methocarbamol (ROBAXIN) 750 MG tablet Take 1 tablet (750 mg) by mouth 3 times daily  Qty: 90 tablet    Associated Diagnoses: Closed fracture of neck of right femur, initial encounter (H)      senna-docusate (SENOKOT-S/PERICOLACE) 8.6-50 MG tablet Take 2-3 tablets by mouth 2 times daily  Qty:      Associated Diagnoses: Closed fracture of neck of right femur, initial encounter (H)         CONTINUE these medications which have CHANGED    Details   acetaminophen (TYLENOL) 325 MG tablet Take 3 tablets (975 mg) by mouth 3 times daily  Qty:      Associated Diagnoses: Closed fracture of neck of right femur, initial  encounter (H)      amLODIPine (NORVASC) 5 MG tablet Take 1.5 tablets (7.5 mg) by mouth daily    Associated Diagnoses: Benign essential hypertension      oxyCODONE (ROXICODONE) 5 MG tablet Take 1 tablet (5 mg) by mouth every 4 hours as needed for moderate to severe pain  Qty: 30 tablet, Refills: 0    Associated Diagnoses: Closed fracture of neck of right femur, initial encounter (H)      polyethylene glycol (MIRALAX) 17 g packet Take 17 g by mouth 2 times daily    Associated Diagnoses: Closed fracture of neck of right femur, initial encounter (H)         CONTINUE these medications which have NOT CHANGED    Details   calcium carbonate 500 mg, elemental, (OSCAL) 500 MG tablet Take 1 tablet by mouth 2 times daily      fluticasone (FLONASE) 50 MCG/ACT nasal spray Spray 2 sprays into both nostrils At Bedtime      levothyroxine (SYNTHROID/LEVOTHROID) 75 MCG tablet Take 75 mcg by mouth every evening      losartan (COZAAR) 50 MG tablet Take 50 mg by mouth daily      !! melatonin 3 MG tablet Take 1 tablet (3 mg) by mouth At Bedtime  Qty:      Associated Diagnoses: Closed fracture of neck of right femur, initial encounter (H)      !! melatonin 3 MG tablet Take 6 mg by mouth At Bedtime      multivitamin, therapeutic (THERA-VIT) TABS tablet Take 1 tablet by mouth daily      !! rOPINIRole (REQUIP) 0.25 MG tablet Take 0.25 mg by mouth 3 times daily      !! rOPINIRole (REQUIP) 1 MG tablet Take 1 mg by mouth At Bedtime      senna (SENOKOT) 8.6 MG tablet Take 1 tablet by mouth 2 times daily as needed for constipation      vitamin D3 (CHOLECALCIFEROL) 2000 units (50 mcg) tablet Take 1 tablet by mouth daily      White Petrolatum-Mineral Oil (ARTIFICIAL TEARS) 83-15 % OINT Place into both eyes At Bedtime       !! - Potential duplicate medications found. Please discuss with provider.        Allergies   Allergies   Allergen Reactions     Cortisone      Documented in long-term care facility MAR - no reaction noted.     Lidocaine       Documented in long-term care facility MAR - no reaction noted.     Triamcinolone Acetonide [Triamcinolone]      Documented in long-term care facility MAR - no reaction noted.     Data   Most Recent 3 CBC's:  Recent Labs   Lab Test 06/04/20  0700 06/03/20  0656 06/01/20  0614 05/31/20  0729   WBC  --  4.1 4.9 5.9   HGB  --  11.2* 10.0* 10.9*   MCV  --  93 95 94    344 310  318 298      Most Recent 3 BMP's:  Recent Labs   Lab Test 06/03/20  0656 06/01/20  0614 05/31/20  0729   * 130* 128*   POTASSIUM 3.7 3.8 3.7   CHLORIDE 95 100 98   CO2 26 22 22   BUN 8 13 10   CR 0.45* 0.48* 0.45*   ANIONGAP 6 7 8   ELBA 8.2* 8.0* 8.3*   GLC 99 98 94     Most Recent 2 LFT's:No lab results found.  Most Recent INR's and Anticoagulation Dosing History:  Anticoagulation Dose History     There is no flowsheet data to display.        Most Recent 3 Troponin's:No lab results found.  Most Recent 6 Bacteria Isolates From Any Culture (See EPIC Reports for Culture Details):No lab results found.  Most Recent TSH, T4 and A1c Labs:No lab results found.  Results for orders placed or performed during the hospital encounter of 05/28/20   XR Pelvis and Hip Bilateral 2 Views    Narrative    EXAM: XR PELVIS AND HIP BILATERAL 2 VIEWS  LOCATION: [facility]  DATE/TIME: 5/28/2020 9:49 PM    INDICATION: Pain after fall. History of left hip fracture and dislocation.  COMPARISON: None.      Impression    IMPRESSION: There is a displaced right femoral neck fracture. 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 Deformity of the right pubic rami appears to be old.   CT Head w/o Contrast    Narrative    CT HEAD W/O CONTRAST 5/28/2020 9:52 PM    Provided History: Fall, struck head  ICD-10:    Comparison: None available.    Technique: Using multidetector thin collimation helical acquisition  technique, axial, coronal and sagittal CT images from the skull base  to the vertex were obtained  without intravenous contrast.     Findings:  Images are moderately degraded secondary to patient motion.  No intracranial hemorrhage, mass effect, or midline shift. The  ventricles are proportionate to the cerebral sulci. Periventricular  and subcortical white matter hypoattenuation is nonspecific but likely  represent chronic small vessel ischemic disease in a patient this age.  Moderate generalized cerebral atrophy. The gray to white matter  differentiation of the cerebral hemispheres is preserved. The basal  cisterns are patent.    The visualized paranasal sinuses are clear. The mastoid air cells are  clear.       Impression    Impression: Images are moderately degraded secondary to patient  motion.  1. No acute intracranial pathology.  2. Senescent changes including moderate Leukoaraiosis and moderate  generalized cerebral atrophy.    BIBI GONZALEZ MD       Time Spent on this Encounter   I, JULIOCESAR Kuhn CNP, personally saw the patient today and spent greater than 30 minutes discharging this patient.    We appreciate the opportunity to care for your patient while in the hospital.  Should you have any questions about their injuries or this discharge summary our contact information is below.    Trauma Services  [facility]   Department of Critical Care and Acute Care Surgery  [address] [place] [id number]  Office: [phone]

## 2020-06-04 NOTE — PROGRESS NOTES
Focus:  Discharge  D:  Orders to discharge back to facility that she came from  I:     Vitals taken        Logged roll turned q2h and PRN        Refused breakfast but at Lunch at 1245         Head to toe assessment done, Coccyx and Buttock clean, dry and intact, Mepilex placed to prevent Coccyx pressure          Purewick removed before discharged           No PIV   A;   Dr gave pt a 1 time dose of Oxycodone 5 mg, see emar         Ice pack to right hip         Nurse to Nurse report given         No clothes or other belonging found other than pt's earring which was put on her ear by NA. Nurse also checked with CN about any belongings  P:  Pt left per Ambulance/micha

## 2020-06-10 ENCOUNTER — AMBULATORY - HEALTHEAST (OUTPATIENT)
Dept: OTHER | Facility: CLINIC | Age: 85
End: 2020-06-10

## 2020-06-10 ENCOUNTER — DOCUMENTATION ONLY (OUTPATIENT)
Dept: OTHER | Facility: CLINIC | Age: 85
End: 2020-06-10

## 2020-09-29 ENCOUNTER — RECORDS - HEALTHEAST (OUTPATIENT)
Dept: LAB | Facility: CLINIC | Age: 85
End: 2020-09-29

## 2020-09-30 LAB
ANION GAP SERPL CALCULATED.3IONS-SCNC: 8 MMOL/L (ref 5–18)
BUN SERPL-MCNC: 33 MG/DL (ref 8–28)
CALCIUM SERPL-MCNC: 9.2 MG/DL (ref 8.5–10.5)
CHLORIDE BLD-SCNC: 96 MMOL/L (ref 98–107)
CO2 SERPL-SCNC: 28 MMOL/L (ref 22–31)
CREAT SERPL-MCNC: 0.72 MG/DL (ref 0.6–1.1)
GFR SERPL CREATININE-BSD FRML MDRD: >60 ML/MIN/1.73M2
GLUCOSE BLD-MCNC: 75 MG/DL (ref 70–125)
POTASSIUM BLD-SCNC: 4.6 MMOL/L (ref 3.5–5)
SODIUM SERPL-SCNC: 132 MMOL/L (ref 136–145)

## 2020-10-18 ENCOUNTER — RECORDS - HEALTHEAST (OUTPATIENT)
Dept: LAB | Facility: CLINIC | Age: 85
End: 2020-10-18

## 2020-10-19 LAB — TSH SERPL DL<=0.005 MIU/L-ACNC: 7.26 UIU/ML (ref 0.3–5)

## 2021-03-03 ENCOUNTER — RECORDS - HEALTHEAST (OUTPATIENT)
Dept: LAB | Facility: CLINIC | Age: 86
End: 2021-03-03

## 2021-03-04 LAB
ALBUMIN SERPL-MCNC: 2.5 G/DL (ref 3.5–5)
ANION GAP SERPL CALCULATED.3IONS-SCNC: 6 MMOL/L (ref 5–18)
BUN SERPL-MCNC: 26 MG/DL (ref 8–28)
CALCIUM SERPL-MCNC: 8.4 MG/DL (ref 8.5–10.5)
CHLORIDE BLD-SCNC: 101 MMOL/L (ref 98–107)
CO2 SERPL-SCNC: 30 MMOL/L (ref 22–31)
CREAT SERPL-MCNC: 0.62 MG/DL (ref 0.6–1.1)
GFR SERPL CREATININE-BSD FRML MDRD: >60 ML/MIN/1.73M2
GLUCOSE BLD-MCNC: 71 MG/DL (ref 70–125)
POTASSIUM BLD-SCNC: 4.1 MMOL/L (ref 3.5–5)
SODIUM SERPL-SCNC: 137 MMOL/L (ref 136–145)
TSH SERPL DL<=0.005 MIU/L-ACNC: 3.85 UIU/ML (ref 0.3–5)